# Patient Record
Sex: MALE | Race: WHITE | URBAN - METROPOLITAN AREA
[De-identification: names, ages, dates, MRNs, and addresses within clinical notes are randomized per-mention and may not be internally consistent; named-entity substitution may affect disease eponyms.]

---

## 2018-06-26 ENCOUNTER — EMERGENCY (EMERGENCY)
Facility: HOSPITAL | Age: 73
LOS: 0 days | Discharge: HOME | End: 2018-06-27
Attending: EMERGENCY MEDICINE | Admitting: EMERGENCY MEDICINE

## 2018-06-26 VITALS
OXYGEN SATURATION: 98 % | RESPIRATION RATE: 20 BRPM | DIASTOLIC BLOOD PRESSURE: 98 MMHG | HEART RATE: 88 BPM | SYSTOLIC BLOOD PRESSURE: 195 MMHG | TEMPERATURE: 98 F

## 2018-06-26 DIAGNOSIS — R53.1 WEAKNESS: ICD-10-CM

## 2018-06-26 DIAGNOSIS — Z87.891 PERSONAL HISTORY OF NICOTINE DEPENDENCE: ICD-10-CM

## 2018-06-26 DIAGNOSIS — R07.9 CHEST PAIN, UNSPECIFIED: ICD-10-CM

## 2018-06-26 LAB
ALBUMIN SERPL ELPH-MCNC: 4 G/DL — SIGNIFICANT CHANGE UP (ref 3.5–5.2)
ALP SERPL-CCNC: 61 U/L — SIGNIFICANT CHANGE UP (ref 30–115)
ALT FLD-CCNC: 15 U/L — SIGNIFICANT CHANGE UP (ref 0–41)
ANION GAP SERPL CALC-SCNC: 14 MMOL/L — SIGNIFICANT CHANGE UP (ref 7–14)
APPEARANCE UR: CLEAR — SIGNIFICANT CHANGE UP
AST SERPL-CCNC: 17 U/L — SIGNIFICANT CHANGE UP (ref 0–41)
BASE EXCESS BLDV CALC-SCNC: 1.9 MMOL/L — SIGNIFICANT CHANGE UP (ref -2–2)
BASOPHILS # BLD AUTO: 0.08 K/UL — SIGNIFICANT CHANGE UP (ref 0–0.2)
BASOPHILS NFR BLD AUTO: 1 % — SIGNIFICANT CHANGE UP (ref 0–1)
BILIRUB SERPL-MCNC: 0.6 MG/DL — SIGNIFICANT CHANGE UP (ref 0.2–1.2)
BILIRUB UR-MCNC: NEGATIVE — SIGNIFICANT CHANGE UP
BUN SERPL-MCNC: 21 MG/DL — HIGH (ref 10–20)
CA-I SERPL-SCNC: 1.17 MMOL/L — SIGNIFICANT CHANGE UP (ref 1.12–1.3)
CALCIUM SERPL-MCNC: 9.4 MG/DL — SIGNIFICANT CHANGE UP (ref 8.5–10.1)
CHLORIDE SERPL-SCNC: 104 MMOL/L — SIGNIFICANT CHANGE UP (ref 98–110)
CHOLEST SERPL-MCNC: 236 MG/DL — HIGH (ref 100–200)
CK MB CFR SERPL CALC: 1.5 NG/ML — SIGNIFICANT CHANGE UP (ref 0.6–6.3)
CK MB CFR SERPL CALC: 1.5 NG/ML — SIGNIFICANT CHANGE UP (ref 0.6–6.3)
CK SERPL-CCNC: 101 U/L — SIGNIFICANT CHANGE UP (ref 0–225)
CK SERPL-CCNC: 96 U/L — SIGNIFICANT CHANGE UP (ref 0–225)
CO2 SERPL-SCNC: 23 MMOL/L — SIGNIFICANT CHANGE UP (ref 17–32)
COLOR SPEC: YELLOW — SIGNIFICANT CHANGE UP
CREAT SERPL-MCNC: 1.1 MG/DL — SIGNIFICANT CHANGE UP (ref 0.7–1.5)
DIFF PNL FLD: NEGATIVE — SIGNIFICANT CHANGE UP
EOSINOPHIL # BLD AUTO: 0.2 K/UL — SIGNIFICANT CHANGE UP (ref 0–0.7)
EOSINOPHIL NFR BLD AUTO: 2.5 % — SIGNIFICANT CHANGE UP (ref 0–8)
GAS PNL BLDV: 139 MMOL/L — SIGNIFICANT CHANGE UP (ref 136–145)
GAS PNL BLDV: SIGNIFICANT CHANGE UP
GLUCOSE SERPL-MCNC: 105 MG/DL — HIGH (ref 70–99)
GLUCOSE UR QL: NEGATIVE MG/DL — SIGNIFICANT CHANGE UP
HCO3 BLDV-SCNC: 27 MMOL/L — SIGNIFICANT CHANGE UP (ref 22–29)
HCT VFR BLD CALC: 40.8 % — LOW (ref 42–52)
HCT VFR BLDA CALC: 45 % — HIGH (ref 34–44)
HDLC SERPL-MCNC: 48 MG/DL — SIGNIFICANT CHANGE UP (ref 40–125)
HGB BLD CALC-MCNC: 14.7 G/DL — SIGNIFICANT CHANGE UP (ref 14–18)
HGB BLD-MCNC: 13.7 G/DL — LOW (ref 14–18)
IMM GRANULOCYTES NFR BLD AUTO: 0.5 % — HIGH (ref 0.1–0.3)
KETONES UR-MCNC: NEGATIVE — SIGNIFICANT CHANGE UP
LACTATE BLDV-MCNC: 1.3 MMOL/L — SIGNIFICANT CHANGE UP (ref 0.5–1.6)
LEUKOCYTE ESTERASE UR-ACNC: NEGATIVE — SIGNIFICANT CHANGE UP
LIPID PNL WITH DIRECT LDL SERPL: 170 MG/DL — HIGH (ref 4–129)
LYMPHOCYTES # BLD AUTO: 2.35 K/UL — SIGNIFICANT CHANGE UP (ref 1.2–3.4)
LYMPHOCYTES # BLD AUTO: 29.2 % — SIGNIFICANT CHANGE UP (ref 20.5–51.1)
MCHC RBC-ENTMCNC: 28.1 PG — SIGNIFICANT CHANGE UP (ref 27–31)
MCHC RBC-ENTMCNC: 33.6 G/DL — SIGNIFICANT CHANGE UP (ref 32–37)
MCV RBC AUTO: 83.6 FL — SIGNIFICANT CHANGE UP (ref 80–94)
MONOCYTES # BLD AUTO: 0.67 K/UL — HIGH (ref 0.1–0.6)
MONOCYTES NFR BLD AUTO: 8.3 % — SIGNIFICANT CHANGE UP (ref 1.7–9.3)
NEUTROPHILS # BLD AUTO: 4.7 K/UL — SIGNIFICANT CHANGE UP (ref 1.4–6.5)
NEUTROPHILS NFR BLD AUTO: 58.5 % — SIGNIFICANT CHANGE UP (ref 42.2–75.2)
NITRITE UR-MCNC: NEGATIVE — SIGNIFICANT CHANGE UP
NRBC # BLD: 0 /100 WBCS — SIGNIFICANT CHANGE UP (ref 0–0)
NT-PROBNP SERPL-SCNC: 100 PG/ML — SIGNIFICANT CHANGE UP (ref 0–300)
PCO2 BLDV: 42 MMHG — SIGNIFICANT CHANGE UP (ref 41–51)
PH BLDV: 7.41 — SIGNIFICANT CHANGE UP (ref 7.26–7.43)
PH UR: 7.5 — SIGNIFICANT CHANGE UP (ref 5–8)
PLATELET # BLD AUTO: 338 K/UL — SIGNIFICANT CHANGE UP (ref 130–400)
PO2 BLDV: 39 MMHG — SIGNIFICANT CHANGE UP (ref 20–40)
POTASSIUM BLDV-SCNC: 4.1 MMOL/L — SIGNIFICANT CHANGE UP (ref 3.3–5.6)
POTASSIUM SERPL-MCNC: 4.5 MMOL/L — SIGNIFICANT CHANGE UP (ref 3.5–5)
POTASSIUM SERPL-SCNC: 4.5 MMOL/L — SIGNIFICANT CHANGE UP (ref 3.5–5)
PROT SERPL-MCNC: 6.7 G/DL — SIGNIFICANT CHANGE UP (ref 6–8)
PROT UR-MCNC: NEGATIVE MG/DL — SIGNIFICANT CHANGE UP
RBC # BLD: 4.88 M/UL — SIGNIFICANT CHANGE UP (ref 4.7–6.1)
RBC # FLD: 13.5 % — SIGNIFICANT CHANGE UP (ref 11.5–14.5)
SAO2 % BLDV: 71 % — SIGNIFICANT CHANGE UP
SODIUM SERPL-SCNC: 141 MMOL/L — SIGNIFICANT CHANGE UP (ref 135–146)
SP GR SPEC: 1.01 — SIGNIFICANT CHANGE UP (ref 1.01–1.03)
TOTAL CHOLESTEROL/HDL RATIO MEASUREMENT: 4.9 RATIO — SIGNIFICANT CHANGE UP (ref 4–5.5)
TRIGL SERPL-MCNC: 141 MG/DL — SIGNIFICANT CHANGE UP (ref 10–149)
TROPONIN T SERPL-MCNC: <0.01 NG/ML — SIGNIFICANT CHANGE UP
TROPONIN T SERPL-MCNC: <0.01 NG/ML — SIGNIFICANT CHANGE UP
UROBILINOGEN FLD QL: 0.2 MG/DL — SIGNIFICANT CHANGE UP (ref 0.2–0.2)
WBC # BLD: 8.04 K/UL — SIGNIFICANT CHANGE UP (ref 4.8–10.8)
WBC # FLD AUTO: 8.04 K/UL — SIGNIFICANT CHANGE UP (ref 4.8–10.8)

## 2018-06-26 RX ORDER — SODIUM CHLORIDE 9 MG/ML
1000 INJECTION INTRAMUSCULAR; INTRAVENOUS; SUBCUTANEOUS ONCE
Qty: 0 | Refills: 0 | Status: COMPLETED | OUTPATIENT
Start: 2018-06-26 | End: 2018-06-26

## 2018-06-26 RX ORDER — FAMOTIDINE 10 MG/ML
40 INJECTION INTRAVENOUS ONCE
Qty: 0 | Refills: 0 | Status: COMPLETED | OUTPATIENT
Start: 2018-06-26 | End: 2018-06-26

## 2018-06-26 RX ORDER — ADENOSINE 3 MG/ML
60 INJECTION INTRAVENOUS ONCE
Qty: 0 | Refills: 0 | Status: DISCONTINUED | OUTPATIENT
Start: 2018-06-26 | End: 2018-06-27

## 2018-06-26 RX ADMIN — FAMOTIDINE 40 MILLIGRAM(S): 10 INJECTION INTRAVENOUS at 21:22

## 2018-06-26 RX ADMIN — SODIUM CHLORIDE 1000 MILLILITER(S): 9 INJECTION INTRAMUSCULAR; INTRAVENOUS; SUBCUTANEOUS at 12:35

## 2018-06-26 NOTE — ED PROVIDER NOTE - NS ED ROS FT
Review of Systems:  	•	CONSTITUTIONAL - no fever, no diaphoresis, no chills  	•	SKIN - no rash  	•	HEMATOLOGIC - no bleeding, no bruising  	•	EYES - no eye pain, (+) blurry vision today   	•	ENT - no change in hearing, no sore throat, no tinnitus  	•	RESPIRATORY - (+) SOB, no cough  	•	CARDIAC - (+) intermittent non radiating CP  	•	GI - no abd pain, no nausea, no vomiting, no diarrhea, no constipation  	•	MUSCULOSKELETAL - no joint paint, no swelling, no redness  	•	NEUROLOGIC - (+) generalized weakness and weakness in b/l arms,  (+) lip numbness/tingling, (+) headache, no LOC   	•	PSYCH - no anxiety, non suicidal, non homicidal, no hallucination, no depression Review of Systems:  	•	CONSTITUTIONAL - no fever, no diaphoresis, no chills  	•	SKIN - no rash  	•	HEMATOLOGIC - no bleeding, no bruising  	•	EYES - no eye pain  	•	ENT - no change in hearing, no sore throat, no tinnitus  	•	RESPIRATORY - (+) SOB, no cough  	•	CARDIAC - (+) intermittent non radiating CP  	•	GI - no abd pain, no nausea, no vomiting, no diarrhea, no constipation  	•	MUSCULOSKELETAL - no joint paint, no swelling, no redness  	•	NEUROLOGIC - (+) generalized weakness and weakness in b/l arms,  (+) lip numbness/tingling, (+) headache, no LOC   	•	PSYCH - no anxiety, non suicidal, non homicidal, no hallucination, no depression

## 2018-06-26 NOTE — ED ADULT TRIAGE NOTE - CHIEF COMPLAINT QUOTE
patient c/o dizziness, SOB, and weakness. States when he eats he feels a lump in his chest. Denies CP.

## 2018-06-26 NOTE — ED ADULT TRIAGE NOTE - MODE OF ARRIVAL
April 1, 2017    Shruthi LEGER [address]  [place] [address]             [facility]  [address]  [facility] [address]  Phone: [phone] Dear Mrs. Lockett:    [facility] is committed to your overall health.  Our records indicate that you are due for an annual checkup with your primary care provider,  Dr. Elizabeth.  Please call [phone] to schedule a routine physical exam. You may also be due for the following test and/or procedures:    Tetanus immunization  Shingles immunization  Pneumonia immunization    If you have a home blood pressure monitor, please bring it with you to your appointment so that we may test for accuracy.     If you have had any of the above done at another facility, please let us know by calling [phone] so that we can update your record.  We will add these results to your chart if you fax them to the fax number listed below.  If you have any questions, please call [phone].       If you have any questions or concerns, please don't hesitate to call.    Sincerely,    Lizzy Mitchell  Clinical Care Coordinator  [facility]  [address] [place] [address]  Phone: [phone]   Fax: [phone]           Walk in

## 2018-06-26 NOTE — ED PROVIDER NOTE - OBJECTIVE STATEMENT
73 y/o M no reported PMHx presents to ED with generalized weakness, dizziness, headache and SOB worse with exertion x2 weeks getting progressively worse, also with intermittent non radiating CP and lip tingling and numbness. Pt had existing appointment with cardiologist Dr. Mckeon in a few weeks but began feeling worsening lightheadedness and blurry vision today so presents to ED for evaluation. Pt reports generalized weakness in b/l arms for months. Pt reports SX are worse with lying down. Pt has been more tired than usual, sleeping excessively throughout the day according to wife. No n/v.  No leg swelling.  No blood in stool. No weight loss. No known trauma. PMD Dr. Greene. 73 y/o M presents to ED with generalized weakness, lightheadedness, intermittent HA, SOB worse with exertion x2 weeks getting progressively worse, also with intermittent non radiating CP (not now) and lip tingling and numbness. Pt reports generalized weakness in b/l arms for months. Pt has been more tired than usual, sleeping excessively throughout the day according to wife. No n/v.  No leg swelling.  No blood in stool. No weight loss. No known trauma. PMD Dr. Greene.

## 2018-06-26 NOTE — ED CDU PROVIDER INITIAL DAY NOTE - ATTENDING CONTRIBUTION TO CARE
Seen with PA agree with above, lungs- clear, abdomen- soft no tenderness to any region, s1s2 no gallops or murmurs, neuro- non focal, Full workup in progress will continue to re-evaluate

## 2018-06-26 NOTE — ED CDU PROVIDER INITIAL DAY NOTE - OBJECTIVE STATEMENT
71 y/o male with no significant PMHX, Former smoker, presenting under two midnight protocol. Pt presenting to with multiple complaints. As per pt for the past approx month has been SOB on exertion - upstairs or inclines, can ambulate a block. denies orthopnea, LE edema. Pt also states he has  had an increase in tiredness and generalized weakness, wife states there has been an increase in patient sleeping throughout the day.  Pt came to the ED today because he had a headache and dizziness associated with blurred vision.  Pt states he had mild numbness of bottom left lip. Also states that he occasionally after eating he will have pain in the middle of his chest that last a few minutes then subsides, currently take pantoprazole. Cardiologist - Dr Mckeon. Denies any slurred speech, facial drooping, palpitations, syncope, fever/chills, N/V/D.

## 2018-06-26 NOTE — ED ADULT NURSE NOTE - OBJECTIVE STATEMENT
pt comes in with complaints of lightheadness/dizzyness. sob worse with ambulating. when he eats, he feels a lump in his chest. has left sided sharp chest pain that comes and goes. has lip tingling that comes and goes. denies black stools. no fever, no n/v. sleeping more then normal. denies falling. weakness it not greater on one side then the other

## 2018-06-26 NOTE — ED PROVIDER NOTE - PHYSICAL EXAMINATION
Alert, NAD, WDWN, non-toxic appearing  PERRL, EOMI, normal pupils, no icterus, normal external ENT, pink/moist membranes, pharynx normal  Airway intact, normal resp effort w/o tachypnea, speaking full sentences, lungs CTA b/l  CVS1S2, RRR, no m/g/r, no JVD, 2+ pulses b/l, no edema, warm/well-perfused  Abdomen soft, no tender/dist/guard/rebound, no CVA tender  FROM all 4 ext, no bony tender/deform, skin warm/dry, no rash  AAOx3, CN 2-12 intact, normal motor, normal sensory, normal gait

## 2018-06-26 NOTE — ED CDU PROVIDER INITIAL DAY NOTE - PROGRESS NOTE DETAILS
s/o note:  received s/o from MARIA GUADALUPE Baron evaluated patient at bedside.  Denies any complaints. patient reports feeling much better. PE: NAD. VSS. s1 s2 no murmur, abd soft/ND/NT/ no swelling and tenderness to extremities. pulses are equal to all extremities. Neuro exam grossly unremarkable. speaking coherently and clear speech. pending stress in am

## 2018-06-27 VITALS
SYSTOLIC BLOOD PRESSURE: 166 MMHG | TEMPERATURE: 97 F | HEART RATE: 75 BPM | DIASTOLIC BLOOD PRESSURE: 93 MMHG | RESPIRATION RATE: 18 BRPM

## 2018-06-27 NOTE — ED CDU PROVIDER SUBSEQUENT DAY NOTE - PROGRESS NOTE DETAILS
Pt stable. Denies any complaints. Currently awaiting nuclear stress test. Discussed results with pt. Pt understands to follow up with pmd/cardiologist.

## 2018-06-27 NOTE — ED CDU PROVIDER SUBSEQUENT DAY NOTE - HISTORY
Sign out received by MARIA GUADALUPE Grider. Pt stable. 2 Ekgs done and wnl. Awaiting echo and nuclear stress test.

## 2018-06-27 NOTE — ED ADULT NURSE REASSESSMENT NOTE - NS ED NURSE REASSESS COMMENT FT1
Pt aox3, in no acute distress, on cardiac monitor and pulse ox, will continue to monitor the pt.
pt. resting at this time, no distress noted, on cardiac monitor, will continue to monitor.
no distress noted, family at bedside, pt. offered assistance, placed on cardiac monitor, will continue to monitor

## 2018-06-27 NOTE — ED CDU PROVIDER DISPOSITION NOTE - CLINICAL COURSE
Patient was sent to [facility] for further evaluation of epigastric pains feeling tired and SOB on excertion, Has remained in no distress and with stable vitals since arrival to [facility], ekgs times two no evidence of ischemic changes, enzymes times two normal, Nuclear stress testing was read as normal, 2- d echo essentially normal, Copies of all blood work and other studies were given to family and patient and will fallow up with bot Cardiology Trice Vernon and will fallow up with Pulmonary evaluation and for possible sleep apnea

## 2019-11-04 ENCOUNTER — EMERGENCY (EMERGENCY)
Facility: HOSPITAL | Age: 74
LOS: 0 days | Discharge: HOME | End: 2019-11-05
Attending: EMERGENCY MEDICINE | Admitting: EMERGENCY MEDICINE
Payer: MEDICARE

## 2019-11-04 VITALS
TEMPERATURE: 98 F | WEIGHT: 169.98 LBS | HEIGHT: 68 IN | SYSTOLIC BLOOD PRESSURE: 142 MMHG | DIASTOLIC BLOOD PRESSURE: 87 MMHG | RESPIRATION RATE: 18 BRPM | OXYGEN SATURATION: 99 % | HEART RATE: 83 BPM

## 2019-11-04 DIAGNOSIS — T20.10XA BURN OF FIRST DEGREE OF HEAD, FACE, AND NECK, UNSPECIFIED SITE, INITIAL ENCOUNTER: ICD-10-CM

## 2019-11-04 DIAGNOSIS — Z23 ENCOUNTER FOR IMMUNIZATION: ICD-10-CM
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
## 2019-11-04 PROCEDURE — 99283 EMERGENCY DEPT VISIT LOW MDM: CPT | Mod: 25

## 2019-11-04 PROCEDURE — 16000 INITIAL TREATMENT OF BURN(S): CPT

## 2019-11-04 RX ORDER — TETANUS TOXOID, REDUCED DIPHTHERIA TOXOID AND ACELLULAR PERTUSSIS VACCINE, ADSORBED 5; 2.5; 8; 8; 2.5 [IU]/.5ML; [IU]/.5ML; UG/.5ML; UG/.5ML; UG/.5ML
0.5 SUSPENSION INTRAMUSCULAR ONCE
Refills: 0 | Status: COMPLETED | OUTPATIENT
Start: 2019-11-04 | End: 2019-11-04

## 2019-11-04 RX ORDER — ACETAMINOPHEN 500 MG
975 TABLET ORAL ONCE
Refills: 0 | Status: COMPLETED | OUTPATIENT
Start: 2019-11-04 | End: 2019-11-04

## 2019-11-04 RX ADMIN — Medication 975 MILLIGRAM(S): at 20:31

## 2019-11-04 RX ADMIN — TETANUS TOXOID, REDUCED DIPHTHERIA TOXOID AND ACELLULAR PERTUSSIS VACCINE, ADSORBED 0.5 MILLILITER(S): 5; 2.5; 8; 8; 2.5 SUSPENSION INTRAMUSCULAR at 20:31
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
## 2019-11-04 NOTE — ED PROVIDER NOTE - NS ED MD EM SELECTION
Discussion/Summary   Your HDL cholesterol is a little low so please increase your exercise to help increase this healthy cholesterol level.  Your kidney function has improved.        Verified Results  LIPID PNL [id number] 12:01AM NARCISA WILKS     Test Name Result Flag Reference   FASTING STATUS UNKNOWN hrs     CHOLESTEROL 133 mg/dl  <200   Desirable            <200  Borderline High      200 to 239  High                 >=240   HDL CHOLESTEROL 45 mg/dl L >49   Low            <40  Borderline Low 40 to 49  Near Optimal   50 to 59  Optimal        >=60   TRIGLYCERIDES 69 mg/dl  <150   Normal                   <150  Borderline High          150 to 199  High                     200 to 499  Very High                >=500   LDL CHOLESTEROL (CALCULATED) 74 mg/dl  <130   OPTIMAL               <100  NEAR OPTIMAL          100-129  BORDERLINE HIGH       130-159  HIGH                  160-189  VERY HIGH             >=190   NON-HDL CHOLESTEROL 88 mg/dl     Therapeutic Target:  CHD and risk equivalents <130  Multiple risk factors    <160  0 to 1 risk factors      <190   CHOLESTEROL/HDL RATIO 3.0  <4.5     BASIC METABOLIC PNL [id number] 12:01AM NARCISA WILKS     Test Name Result Flag Reference   SODIUM 144 mmol/L  135-145   POTASSIUM 4.3 mmol/L  3.4-5.1   CHLORIDE 114 mmol/L H    CARBON DIOXIDE 22 mmol/L  21-32   ANION GAP 12 mmol/L  10-20   GLUCOSE 84 mg/dl  65-99   BUN 20 mg/dl  6-20   CREATININE 1.41 mg/dl H 0.51-0.95   GFR EST.AFRICAN AMER 46     eGFR 30-59 mL/min/1.73m2 = Moderate decrease in kidney function. Stage 3 CKD (chronic kidney disease) or moderate kidney disease.   GFR EST.NONAFRI AMER 40     eGFR 30-59 mL/min/1.73m2 = Moderate decrease in kidney function. Stage 3 CKD (chronic kidney disease) or moderate kidney disease.   BUN/CREATININE RATIO 14  7-25   CALCIUM 9.7 mg/dl  8.4-10.2   FASTING STATUS UNKNOWN hrs          [address] Exp Problem Focused - Mod. Complex

## 2019-11-04 NOTE — ED ADULT NURSE NOTE - NSIMPLEMENTINTERV_GEN_ALL_ED
Implemented All Universal Safety Interventions:  [place] to call system. Call bell, personal items and telephone within reach. Instruct patient to call for assistance. Room bathroom lighting operational. Non-slip footwear when patient is off stretcher. Physically safe environment: no spills, clutter or unnecessary equipment. Stretcher in lowest position, wheels locked, appropriate side rails in place.

## 2019-11-04 NOTE — ED PROVIDER NOTE - PATIENT PORTAL LINK FT
You can access the FollowMyHealth Patient Portal offered by [facility] by registering at the following website: [email or web address]. By joining [employer]’s FollowMyHealth portal, you will also be able to view your health information using other applications (apps) compatible with our system.
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
## 2019-11-04 NOTE — ED PROVIDER NOTE - NSFOLLOWUPCLINICS_GEN_ALL_ED_FT
[facility]  [facility]  [address], [address]  [place] [address]  Phone: [phone]  Fax:   Follow Up Time:
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
## 2019-11-04 NOTE — ED PROVIDER NOTE - PHYSICAL EXAMINATION
Physical Exam    Vital Signs: I have reviewed the initial vital signs.  Constitutional: well-nourished, appears stated age, no acute distress  Eyes: Conjunctiva pink, Sclera clear, PERRLA, EOMI.  Nose: no singed nares  Throat: no swelling   Cardiovascular: S1 and S2, regular rate, regular rhythm, well-perfused extremities, radial pulses equal and 2+  Respiratory: unlabored respiratory effort, clear to auscultation bilaterally no wheezing, rales and rhonchi  Gastrointestinal: soft, non-tender abdomen, no pulsatile mass, normal bowl sounds  Musculoskeletal: supple neck, no lower extremity edema, no midline tenderness  Integumentary: warm, dry, +erythema to left side of face with no blisters present + singed eyebrow and left scalp hair  Neurologic: awake, alert, cranial nerves II-XII grossly intact, extremities’ motor and sensory functions grossly intact  Psychiatric: appropriate mood, appropriate affect

## 2019-11-05 PROBLEM — K21.9 GASTRO-ESOPHAGEAL REFLUX DISEASE WITHOUT ESOPHAGITIS: Chronic | Status: ACTIVE | Noted: 2018-06-26

## 2020-01-14 ENCOUNTER — TRANSCRIPTION ENCOUNTER (OUTPATIENT)
Age: 75
End: 2020-01-14

## 2021-08-24 ENCOUNTER — FOLLOW UP (OUTPATIENT)
Dept: URBAN - METROPOLITAN AREA CLINIC 6 | Facility: CLINIC | Age: 76
End: 2021-08-24

## 2021-08-24 DIAGNOSIS — H40.1132: ICD-10-CM

## 2021-08-24 DIAGNOSIS — Z96.1: ICD-10-CM

## 2021-08-24 PROCEDURE — 92014 COMPRE OPH EXAM EST PT 1/>: CPT

## 2021-08-24 ASSESSMENT — VISUAL ACUITY
OD_SC: 20/40
OD_PH: 20/20
OS_PH: 20/40
OS_SC: 20/40

## 2021-08-24 ASSESSMENT — TONOMETRY
OD_IOP_MMHG: 14
OS_IOP_MMHG: 14

## 2022-08-31 ENCOUNTER — OFFICE VISIT (OUTPATIENT)
Dept: NEUROSURGERY | Facility: CLINIC | Age: 77
End: 2022-08-31
Payer: MEDICARE

## 2022-08-31 ENCOUNTER — TELEPHONE (OUTPATIENT)
Dept: NEUROLOGY | Facility: CLINIC | Age: 77
End: 2022-08-31

## 2022-08-31 VITALS
HEART RATE: 74 BPM | BODY MASS INDEX: 25.76 KG/M2 | HEIGHT: 68 IN | SYSTOLIC BLOOD PRESSURE: 138 MMHG | DIASTOLIC BLOOD PRESSURE: 82 MMHG | TEMPERATURE: 97.6 F | WEIGHT: 170 LBS | OXYGEN SATURATION: 97 %

## 2022-08-31 DIAGNOSIS — R93.0 ABNORMAL MRA, HEAD: ICD-10-CM

## 2022-08-31 PROBLEM — I65.1 BASILAR ARTERY STENOSIS: Status: ACTIVE | Noted: 2022-08-31

## 2022-08-31 PROCEDURE — 99204 OFFICE O/P NEW MOD 45 MIN: CPT | Performed by: NURSE PRACTITIONER

## 2022-08-31 RX ORDER — CYCLOSPORINE 0.5 MG/ML
EMULSION OPHTHALMIC
COMMUNITY
Start: 2022-05-26

## 2022-08-31 RX ORDER — CLOPIDOGREL BISULFATE 75 MG/1
75 TABLET ORAL DAILY
COMMUNITY
Start: 2022-08-08

## 2022-08-31 RX ORDER — LATANOPROST 50 UG/ML
SOLUTION/ DROPS OPHTHALMIC
COMMUNITY
Start: 2022-08-25

## 2022-08-31 RX ORDER — ATORVASTATIN CALCIUM 80 MG/1
TABLET, FILM COATED ORAL
COMMUNITY
Start: 2022-08-08

## 2022-08-31 NOTE — ASSESSMENT & PLAN NOTE
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 and MRA head/neck was recommended which patient underwent  Imaging reviewed with Dr Ryann Leo:    MRA head and neck:  Done at outside facility, read states short-segment severe stenosis versus occlusion of distal basilar artery  Very poor imaging study    Plan:  · Continue to monitor neurological exam  · Reviewed imaging with patient  · Ordered CTA head and neck w/wo to further assess vasculature  · Also ordered P2Y12 since patient is on Plavix  · Patient will follow-up in the next 2 weeks once imaging and blood work completed or sooner symptoms worsen  · Also placed referral to Neurology for stroke prevention  · Given patient's ongoing headaches which are new  Spoke with patient about keeping a journal and checking his blood pressure when he develops headaches for the next few weeks  · Patient made aware to seek care sooner if he develops any new or worsening neurological changes or red flag signs   · Patient made aware to contact Neurosurgery with any further questions or concerns

## 2022-08-31 NOTE — TELEPHONE ENCOUNTER
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

## 2022-08-31 NOTE — PROGRESS NOTES
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 and MRA head/neck was recommended which patient underwent  Imaging reviewed with Dr Shaheed Ross:    MRA head and neck:  Done at outside facility, read states short-segment severe stenosis versus occlusion of distal basilar artery  Very poor imaging study    Plan:  · Continue to monitor neurological exam  · Reviewed imaging with patient  · Ordered CTA head and neck w/wo to further assess vasculature  · Also ordered P2Y12 since patient is on Plavix  · Patient will follow-up in the next 2 weeks once imaging and blood work completed or sooner symptoms worsen  · Also placed referral to Neurology for stroke prevention  · Given patient's ongoing headaches which are new  Spoke with patient about keeping a journal and checking his blood pressure when he develops headaches for the next few weeks  · Patient made aware to seek care sooner if he develops any new or worsening neurological changes or red flag signs   · Patient made aware to contact Neurosurgery with any further questions or concerns  Diagnoses and all orders for this visit:    Abnormal MRA, head  -     Ambulatory Referral to Neurosurgery  -     CTA head and neck w wo contrast; Future  -     Creatinine, serum; Future  -     BUN; Future  -     P2Y12 Platelet inhibitor; Future  -     Ambulatory referral to Neurology;  Future    Other orders  -     atorvastatin (LIPITOR) 80 mg tablet; TAKE 1 TABLET BY MOUTH EVERY 24 HOURS IN THE EVENING  -     latanoprost (XALATAN) 0 005 % ophthalmic solution  -     Restasis 0 05 % ophthalmic emulsion  -     clopidogrel (PLAVIX) 75 mg tablet; Take 75 mg by mouth daily              CHIEF COMPLAINT    Chief Complaint   Patient presents with    Consult     Abnormal MRA, head       HISTORY    History of Present Illness     76y o  year old male with past medical history significant for cataracts status post surgery  Patient seen in outpatient office today as a self-referral for further workup and evaluation of possible basilar artery stenosis and possible left vertebral artery stenosis  Patient was originally seen when he presented to urgent care in June after finding a lump in the front of his left ear and was given Keflex, lump has since resolved  He ultimately underwent a CT scan which demonstrated a absent enhancement in the 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 and MRA imaging was recommended which demonstrated possible distal basilar artery stenosis versus occlusion  MRA very poor quality  These were incidental findings  Patient states he believes he was started on aspirin in July after CT scan was completed  He states he likely started plavix at beginning of August after MRI completed both prescribed by his PCP  He states he normally does not have headaches but he states over the past few months he noticed headaches almost every day but were mostly manageable  He also reports some left eye pain as well as blurry vision more on left than right  He does report receiving cataract surgery about a year ago  His family also noted some memory problems as well as confusion starting in January but this is not constant  Also endorses some lightheadedness which is slight and usually associated with a headache  Patient currently complaining of a slight headache above left eye which he rates 2-3/10    He also reports some shortness of breath if he exerts himself  He denies any stroke-like symptoms  He denies any recent falls or traumas or difficulty with his balance  He has not recently seen Neurology  He denies any chest pain, abdominal pain, nausea, vomiting, diarrhea, no problems with bowel or bladder, no new weakness or numbness/tingling  Both myself and Dr Marlee García discussed how patient does not demonstrate any symptoms of basilar artery insufficiency  Would recommend continuing medical management with aspirin and Plavix  Given MRA poor quality will repeat imaging in the next few weeks with CTA head and neck to further assess vasculature  Also will have patient have P2Y12 drawn since on Plavix  Also placed referral to Neurology for stroke prevention  Patient will follow-up in the next few weeks once imaging completed or sooner symptoms worsen  HPI    See Discussion    REVIEW OF SYSTEMS    Review of Systems   Constitutional: Positive for chills  HENT: Negative  Eyes: Positive for pain (left eye off and on) and visual disturbance (left eye gets blurry at times)  Respiratory: Positive for shortness of breath (mild)  Cardiovascular: Negative  Gastrointestinal: Negative  Endocrine: Negative  Genitourinary: Negative  Musculoskeletal: Positive for neck stiffness (cracking lately)  Skin: Negative  Allergic/Immunologic: Negative  Neurological: Positive for light-headedness (at times) and headaches (almost daily, manageable)  Hematological: Negative  Psychiatric/Behavioral: Positive for confusion  Memory   All other systems reviewed and are negative  ROS reviewed with patient and agree and changes were made as needed      Meds/Allergies     Current Outpatient Medications   Medication Sig Dispense Refill    atorvastatin (LIPITOR) 80 mg tablet TAKE 1 TABLET BY MOUTH EVERY 24 HOURS IN THE EVENING      clopidogrel (PLAVIX) 75 mg tablet Take 75 mg by mouth daily      latanoprost (XALATAN) 0 005 % ophthalmic solution       Restasis 0 05 % ophthalmic emulsion        No current facility-administered medications for this visit  No Known Allergies    PAST HISTORY    History reviewed  No pertinent past medical history  History reviewed  No pertinent surgical history  History reviewed  No pertinent family history  Above history personally reviewed  EXAM    Vitals:Blood pressure 138/82, pulse 74, temperature 97 6 °F (36 4 °C), temperature source Temporal, height 5' 8" (1 727 m), weight 77 1 kg (170 lb), SpO2 97 %  ,Body mass index is 25 85 kg/m²  Physical Exam  Vitals reviewed  Constitutional:       General: He is awake  He is not in acute distress  Appearance: Normal appearance  He is not ill-appearing  HENT:      Head: Normocephalic and atraumatic  Eyes:      Extraocular Movements: Extraocular movements intact and EOM normal       Conjunctiva/sclera: Conjunctivae normal       Pupils: Pupils are equal, round, and reactive to light  Cardiovascular:      Rate and Rhythm: Normal rate  Pulmonary:      Effort: Pulmonary effort is normal  No respiratory distress  Chest:      Chest wall: No tenderness  Abdominal:      General: There is no distension  Palpations: Abdomen is soft  Tenderness: There is no abdominal tenderness  Musculoskeletal:         General: Normal range of motion  Cervical back: Normal range of motion and neck supple  Skin:     General: Skin is warm and dry  Neurological:      Mental Status: He is alert and oriented to person, place, and time  Coordination: Finger-Nose-Finger Test normal       Gait: Gait is intact  Deep Tendon Reflexes: Strength normal       Reflex Scores:       Bicep reflexes are 2+ on the right side and 2+ on the left side  Patellar reflexes are 2+ on the right side and 2+ on the left side    Psychiatric:         Attention and Perception: Attention and perception normal          Mood and Affect: Mood and affect normal          Speech: Speech normal          Behavior: Behavior normal  Behavior is cooperative  Thought Content: Thought content normal          Cognition and Memory: Cognition normal          Judgment: Judgment normal          Neurologic Exam     Mental Status   Oriented to person, place, and time  Follows 2 step commands  Attention: normal  Concentration: normal    Speech: speech is normal   Level of consciousness: alert  Knowledge: good  Able to perform simple calculations  Able to name object  Normal comprehension  Cranial Nerves     CN II   Right visual field deficit: none  Left visual field deficit: Grossly intact except when 3 fingers held in front of patient with right eye closed he originally states he saw 2 fingers but then upon repetitions states 3  CN III, IV, VI   Pupils are equal, round, and reactive to light  Extraocular motions are normal    CN III: no CN III palsy  CN VI: no CN VI palsy  Nystagmus: none   Diplopia: none  Conjugate gaze: present    CN V   Facial sensation intact  CN VII   Facial expression full, symmetric  CN VIII   CN VIII normal    Hearing: intact    CN IX, X   CN IX normal      CN XI   CN XI normal      CN XII   CN XII normal      Motor Exam   Muscle bulk: normal  Overall muscle tone: normal  Right arm pronator drift: absent  Left arm pronator drift: absent    Strength   Strength 5/5 throughout  Sensory Exam   Light touch normal    Proprioception normal    JPS and DST intact     Gait, Coordination, and Reflexes     Gait  Gait: normal    Coordination   Finger to nose coordination: normal    Tremor   Resting tremor: absent  Intention tremor: absent  Action tremor: absent    Reflexes   Right biceps: 2+  Left biceps: 2+  Right patellar: 2+  Left patellar: 2+  Right [place]: absent  Left [place]: absent  Right ankle clonus: absent  Left ankle clonus: absent        MEDICAL DECISION MAKING    Imaging Studies:     No results found      I have personally reviewed pertinent reports     and I have personally reviewed pertinent films in PACS

## 2022-08-31 NOTE — PATIENT INSTRUCTIONS
Patient will follow-up in the next few weeks with CTA head and neck or sooner symptoms worsen    Ordered P2Y12 since patient is on aspirin and Plavix

## 2022-09-10 ENCOUNTER — APPOINTMENT (OUTPATIENT)
Dept: LAB | Facility: CLINIC | Age: 77
End: 2022-09-10
Payer: MEDICARE

## 2022-09-10 DIAGNOSIS — R93.0 ABNORMAL MRA, HEAD: ICD-10-CM

## 2022-09-10 LAB
BUN SERPL-MCNC: 24 MG/DL (ref 5–25)
CREAT SERPL-MCNC: 1.16 MG/DL (ref 0.6–1.3)
GFR SERPL CREATININE-BSD FRML MDRD: 59 ML/MIN/1.73SQ M
PA ADP BLD-ACNC: 79 PRU (ref 194–418)

## 2022-09-10 PROCEDURE — 36415 COLL VENOUS BLD VENIPUNCTURE: CPT

## 2022-09-10 PROCEDURE — 84520 ASSAY OF UREA NITROGEN: CPT

## 2022-09-10 PROCEDURE — 82565 ASSAY OF CREATININE: CPT

## 2022-09-10 PROCEDURE — 85576 BLOOD PLATELET AGGREGATION: CPT

## 2022-09-14 ENCOUNTER — HOSPITAL ENCOUNTER (OUTPATIENT)
Dept: RADIOLOGY | Facility: HOSPITAL | Age: 77
Discharge: HOME | End: 2022-09-14
Payer: MEDICARE

## 2022-09-14 DIAGNOSIS — R93.0 ABNORMAL MRA, HEAD: ICD-10-CM

## 2022-09-14 PROCEDURE — G1004 CDSM NDSC: HCPCS

## 2022-09-14 PROCEDURE — 70496 CT ANGIOGRAPHY HEAD: CPT

## 2022-09-14 PROCEDURE — 70498 CT ANGIOGRAPHY NECK: CPT

## 2022-09-14 RX ADMIN — IOHEXOL 85 ML: 350 INJECTION, SOLUTION INTRAVENOUS at 17:50

## 2022-09-27 PROBLEM — Z00.00 ENCOUNTER FOR PREVENTIVE HEALTH EXAMINATION: Status: ACTIVE | Noted: 2022-09-27

## 2022-09-29 ENCOUNTER — OFFICE VISIT (OUTPATIENT)
Dept: NEUROSURGERY | Facility: CLINIC | Age: 77
End: 2022-09-29
Payer: MEDICARE

## 2022-09-29 VITALS
SYSTOLIC BLOOD PRESSURE: 138 MMHG | WEIGHT: 176 LBS | HEART RATE: 77 BPM | BODY MASS INDEX: 26.67 KG/M2 | TEMPERATURE: 97.5 F | RESPIRATION RATE: 16 BRPM | DIASTOLIC BLOOD PRESSURE: 80 MMHG | HEIGHT: 68 IN

## 2022-09-29 DIAGNOSIS — I65.1 BASILAR ARTERY STENOSIS: Primary | ICD-10-CM

## 2022-09-29 DIAGNOSIS — R93.0 ABNORMAL MRA, HEAD: ICD-10-CM

## 2022-09-29 PROCEDURE — 99214 OFFICE O/P EST MOD 30 MIN: CPT | Performed by: NURSE PRACTITIONER

## 2022-09-29 RX ORDER — ASPIRIN 81 MG/1
81 TABLET ORAL DAILY
COMMUNITY
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
## 2022-09-29 NOTE — PATIENT INSTRUCTIONS
Symptoms of vertebrobasilar insufficiency   Loss of vision in part or all of both eyes  Double vision  Vertigo (spinning sensation)   Numbness or tingling  Nausea and vomiting  Slurred speech  Loss of coordination, dizziness or confusion  Trouble swallowing  Symptoms include a stroke or stroke-like attack with associated numbness, weakness, or tingling     People may experience:   Whole body: collapse or dizziness   Muscular: problems with coordination or weakness   Also common: double vision, mini-strokes (transient ischemic attacks), nausea, reduced sensation of touch, or stroke

## 2022-09-29 NOTE — ASSESSMENT & PLAN NOTE
As addressed in HPI   Is maintaining Headache diary     Imagining   · 9/14/2022 CTA Brain and neck w/wo contrast  Very diminutive left vertebral artery which focally occludes at the proximal V3 segment at the C2-C3 intervertebral disc level and subsequent faint wisp-like reconstitution of the distal V3/V4 segments, possibly related  Deepa Skipper Deepa Skipper Correlation for signs and symptoms vertebrobasilar insufficiency recommended  2   Focal tandem occlusion of the distal basilar artery just above the right superior cerebellar artery origin  3   Left posterior cerebral artery and left superior cerebellar arteries appear to be reconstituted via collateral flow from the left posterior communicating artery  Deepa Skipper Deepa Skipper No acute infarction, intracranial hemorrhage or mass effect  Reviewed in collaboration with Dr Lynn Ley and case discussion regarding ngoing neurosurgery involvement  Plan   · CTA Brain and neck reviewed with patient/daughter   · Continue current medication dosing ASA 81 mg po qd and Plavix 75mg po qd   · Scheduled neurology consult visit appointment 1/10/2023, checkout called at end of visit to inquire if earlier appointment and confirm patient on a cancellation list   As per case discussion with Dr Lynn Ley and explained to patient  · No surgical intervention vertebrobasilar artery with complete occlusion, is not amenable to surgical intervention   · Has collateral circulation   · Continue medical management under care of neurology and PCP   · Continue ani platelets as above  · At risk for stroke, need o control risks HTN, Hypercholesterolemia, --PCP will monitor and treat as indicated  Stopped smoking 40 years ago    · Continue headache diary when headache occur check and document B/P , and any other symptoms such as dizziness, visual changes etc as per symptoms provided in AVS  Take diary to neurology and PCP visits      · AVS education provided and reviewed symptoms of vertebrobasilar insufficiency, stroke , low salt diet, low cholesterol diet  · If stroke like symptoms for vertebral artery insufficiency report to closest emergency department   · Continue  Routine eye examinations , you reported eye examination every 6 months  · F/U with neurosurgery prn  · Weill electronically submit office visit note to PCP (as per patient's request) and Dr Lexie Gitelman, neurology

## 2022-10-06 ENCOUNTER — TELEPHONE (OUTPATIENT)
Dept: NEUROLOGY | Facility: CLINIC | Age: 77
End: 2022-10-06

## 2022-10-06 NOTE — TELEPHONE ENCOUNTER
Called patient to offer a sooner appt per Erik's request  If the patient calls back please assist with scheduling on 11/2 at 1130am in the CV office

## 2022-10-06 NOTE — TELEPHONE ENCOUNTER
Received VM but could not understand message  Called pt back and he says that he is returning a call from [facility] regarding appt with Dr Ne Lujan  Attempted to make appt but unable due to hold status  Rosette - Pt is agreeable to date and time for CV location  Please schedule pt  Thank you!

## 2022-10-11 ENCOUNTER — APPOINTMENT (OUTPATIENT)
Dept: NEUROLOGY | Facility: CLINIC | Age: 77
End: 2022-10-11

## 2022-10-19 ENCOUNTER — TELEPHONE (OUTPATIENT)
Dept: NEUROLOGY | Facility: CLINIC | Age: 77
End: 2022-10-19

## 2022-11-02 ENCOUNTER — CONSULT (OUTPATIENT)
Dept: NEUROLOGY | Facility: CLINIC | Age: 77
End: 2022-11-02

## 2022-11-02 ENCOUNTER — TELEPHONE (OUTPATIENT)
Dept: NEUROLOGY | Facility: CLINIC | Age: 77
End: 2022-11-02

## 2022-11-02 VITALS
SYSTOLIC BLOOD PRESSURE: 162 MMHG | WEIGHT: 175.7 LBS | HEIGHT: 68 IN | TEMPERATURE: 98 F | HEART RATE: 76 BPM | BODY MASS INDEX: 26.63 KG/M2 | DIASTOLIC BLOOD PRESSURE: 70 MMHG

## 2022-11-02 DIAGNOSIS — I65.1 BASILAR ARTERY STENOSIS: Primary | ICD-10-CM

## 2022-11-02 DIAGNOSIS — I65.02 VERTEBRAL ARTERY OCCLUSION, LEFT: ICD-10-CM

## 2022-11-02 DIAGNOSIS — R51.9 HEADACHE: ICD-10-CM

## 2022-11-02 DIAGNOSIS — R93.0 ABNORMAL MRA, HEAD: ICD-10-CM
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
## 2022-11-02 NOTE — PATIENT INSTRUCTIONS
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:  Hudson Plunkett presents for an initial consultation with regard to incidentally discovered vertebral and basilar artery occlusions  This is most likely the result of atherosclerotic disease that has built up slowly over time  His neurologic exam is excellent in the office today and we do not see signs or symptoms concerning for vertebrobasilar insufficiency  Ultimately I would consider this to be incidental but it does place him at higher risk for stroke compared to the average person for which reason it is important that we use medication to lower that risk as much as possible   - for the time being he should remain on his combination of aspirin, Plavix, and Lipitor  -we will defer to his primary care team to monitor his cholesterol panel and blood sugar numbers but if it has not been checked in the last 6 months I would recommend a cholesterol panel to be done with a goal LDL cholesterol of less than 70  I would not decrease his Lipitor to less than 40 mg     - I would like him to occasionally check his blood pressure away from the doctor's office and to make sure that those numbers are consistently less than 130/80  If they are frequently higher he should contact his primary care team for adjustments however if he is on blood pressure medication or requires blood pressure medication over time care should be taken to slowly reduce blood pressure to make sure he does not have any loss of blood flow towards the back of the head   - we will plan to repeat a CT angiogram of the head and neck in 6 months and he will return to see me directly in 8 months time   -he does not have specific activity restrictions per se however if he notices that with prolonged neck extension or turning his head at extreme angles for prolonged time he starts to experience episodes such as vertigo, double vision, or passing out he should let our office know right away    He should avoid those activities if possible  If he were to have any stroke-like symptoms such as sudden painless loss of vision or double vision, difficulty speaking or swallowing, vertigo/ room spinning that does not quickly resolve, or weakness / numbness/loss of coordination affecting 1 side of the face or body he should proceed by ambulance to the nearest emergency room immediately

## 2022-11-02 NOTE — PROGRESS NOTES
HPI:     Nita Pisano is a very pleasant 76year old male who presents today to discuss stroke prevention in the setting of recent incidental finding of left vertebrobasilar artery occlusion  Nita Pisano was originally evaluated in June 2022 at an urgent care for a "lump" that had appeared next to his left ear  He was given Keflex and the lump resolved, however he was referred for a CT to demonstrate resolution of any residual soft tissue infection  CT incidentally showed absent enhancement in the V3 segment of the left vertebral artery  He was referred for an MRA that then demonstrated distal basilar artery stenosis  He was started on Aspirin by his PCP in July after the CT scan was completed and subsequently started on Plavix also by his PCP after the MRI was completed  He has remained on a statin for many years d/t hyperlipidemia  He was evaluated by neurosurgery on 8/31/22 who advised him that operating on the defect would lead to unreasonable increase in risk of stroke  He did not demonstrate any symptoms of basilar artery insufficiency at that time  He was referred to our care to discuss stroke prevention  Today (11/02/2022) he presents to clinic with his daughter and wife  He reports that his major concern at this time is frequent headaches that started in Dec 2021  The headaches occur in the frontal region around 3 times a week  The headaches usually resolve with Tylenol and he has not noticed any aggravating or exacerbating factors  He does have blurred vision that has been ongoing for many years in the setting of cataracts/gluacoma  His blurred vision does seem to worsen during his headaches  Pt denies any painless loss of vision, double vision, trouble speaking/swallowing, vertigo, weakness/numbness affecting 1 side of his face  He also denies any abnormal bleeding/bruising or any history of a clotting disorder  Overall there were no indications of basilar artery insufficiency at this time   He does note some occasional mild dizziness when he stands from a seated or laying position  Patient's wife reports he has had several episodes of transient confusion while navigating however no lasting memory deficits  He checks his blood pressure at home (only during headaches) and the systolic blood pressures have reportedly run up to the 170s  Patient feels that they don't run that high "normally", however has not been checking blood pressure outside of headache episodes  During this visit we reviewed his CTA together and he demonstrated his understanding of his condition  He was advised that there is not specific research regarding the time frame of treatment with dual platelet therapy in patients with a condition like his, and he should continue all of current medications for the time being  We also discussed the risk of bleeding on these medications and the patient verbalized his understanding  PHYSICAL EXAM:    Cranial Nerves      CN II   Right visual field deficit: none  Left visual field deficit: none   CN III, IV, VI   Pupils are equal, round, and reactive to light  Extraocular motions are normal    Nystagmus: none   Diplopia: none  Conjugate gaze: present     CN V   Facial sensation symmetric      CN VII   Facial expression full, symmetric       CN VIII   Hearing: intact     CN IX, X   Palate muscles symmetric, swallowing intact     CN XI   Shoulder shrug strong and symmetric     CN XII   Tongue movement symmetric     Motor Exam   Muscle bulk: symmetric       Strength   Strength 5/5 throughout and symmetric     Sensory Exam   Light touch symmetric

## 2022-11-02 NOTE — TELEPHONE ENCOUNTER
Patient is scheduled for CTA in May and was asking about the lab order needed prior for bloodwork  I did not see one in chart

## 2022-11-03 NOTE — TELEPHONE ENCOUNTER
Well he is quite right    normally I place those at the time of the order but may not have done so this time  Bun and Creat orders placed  Thanks

## 2023-04-28 ENCOUNTER — APPOINTMENT (OUTPATIENT)
Dept: LAB | Facility: CLINIC | Age: 78
End: 2023-04-28

## 2023-04-28 DIAGNOSIS — I65.02 VERTEBRAL ARTERY OCCLUSION, LEFT: ICD-10-CM

## 2023-04-28 DIAGNOSIS — R93.0 ABNORMAL MRA, HEAD: ICD-10-CM

## 2023-04-28 DIAGNOSIS — I65.1 BASILAR ARTERY STENOSIS: ICD-10-CM

## 2023-04-28 LAB
BUN SERPL-MCNC: 23 MG/DL (ref 5–25)
CREAT SERPL-MCNC: 1.13 MG/DL (ref 0.6–1.3)
GFR SERPL CREATININE-BSD FRML MDRD: 61 ML/MIN/1.73SQ M

## 2023-05-02 ENCOUNTER — HOSPITAL ENCOUNTER (OUTPATIENT)
Dept: CT IMAGING | Facility: HOSPITAL | Age: 78
Discharge: HOME | End: 2023-05-02
Attending: PSYCHIATRY & NEUROLOGY

## 2023-05-02 DIAGNOSIS — I65.1 BASILAR ARTERY STENOSIS: ICD-10-CM

## 2023-05-02 DIAGNOSIS — R51.9 HEADACHE: ICD-10-CM

## 2023-05-02 DIAGNOSIS — I65.02 VERTEBRAL ARTERY OCCLUSION, LEFT: ICD-10-CM

## 2023-05-02 RX ADMIN — IOHEXOL 85 ML: 350 INJECTION, SOLUTION INTRAVENOUS at 11:27

## 2023-05-11 ENCOUNTER — EMERGENCY (EMERGENCY)
Facility: HOSPITAL | Age: 78
LOS: 0 days | Discharge: HOME | End: 2023-05-11
Attending: STUDENT IN AN ORGANIZED HEALTH CARE EDUCATION/TRAINING PROGRAM
Payer: MEDICARE

## 2023-05-11 VITALS
WEIGHT: 169.98 LBS | HEIGHT: 68 IN | RESPIRATION RATE: 18 BRPM | HEART RATE: 79 BPM | OXYGEN SATURATION: 98 % | DIASTOLIC BLOOD PRESSURE: 90 MMHG | TEMPERATURE: 98 F | SYSTOLIC BLOOD PRESSURE: 187 MMHG

## 2023-05-11 DIAGNOSIS — Q64.79 OTHER CONGENITAL MALFORMATIONS OF BLADDER AND URETHRA: ICD-10-CM

## 2023-05-11 DIAGNOSIS — Z87.19 PERSONAL HISTORY OF OTHER DISEASES OF THE DIGESTIVE SYSTEM: ICD-10-CM

## 2023-05-11 DIAGNOSIS — I25.10 ATHEROSCLEROTIC HEART DISEASE OF NATIVE CORONARY ARTERY WITHOUT ANGINA PECTORIS: ICD-10-CM

## 2023-05-11 DIAGNOSIS — Z87.891 PERSONAL HISTORY OF NICOTINE DEPENDENCE: ICD-10-CM

## 2023-05-11 DIAGNOSIS — Z87.448 PERSONAL HISTORY OF OTHER DISEASES OF URINARY SYSTEM: ICD-10-CM

## 2023-05-11 DIAGNOSIS — E78.5 HYPERLIPIDEMIA, UNSPECIFIED: ICD-10-CM

## 2023-05-11 DIAGNOSIS — Z79.02 LONG TERM (CURRENT) USE OF ANTITHROMBOTICS/ANTIPLATELETS: ICD-10-CM

## 2023-05-11 DIAGNOSIS — R31.9 HEMATURIA, UNSPECIFIED: ICD-10-CM

## 2023-05-11 DIAGNOSIS — Z79.82 LONG TERM (CURRENT) USE OF ASPIRIN: ICD-10-CM

## 2023-05-11 LAB
ALBUMIN SERPL ELPH-MCNC: 4.7 G/DL — SIGNIFICANT CHANGE UP (ref 3.5–5.2)
ALP SERPL-CCNC: 88 U/L — SIGNIFICANT CHANGE UP (ref 30–115)
ALT FLD-CCNC: 27 U/L — SIGNIFICANT CHANGE UP (ref 0–41)
ANION GAP SERPL CALC-SCNC: 14 MMOL/L — SIGNIFICANT CHANGE UP (ref 7–14)
APPEARANCE UR: CLEAR — SIGNIFICANT CHANGE UP
AST SERPL-CCNC: 24 U/L — SIGNIFICANT CHANGE UP (ref 0–41)
BACTERIA # UR AUTO: NEGATIVE — SIGNIFICANT CHANGE UP
BASOPHILS # BLD AUTO: 0.13 K/UL — SIGNIFICANT CHANGE UP (ref 0–0.2)
BASOPHILS NFR BLD AUTO: 1.2 % — HIGH (ref 0–1)
BILIRUB SERPL-MCNC: 0.9 MG/DL — SIGNIFICANT CHANGE UP (ref 0.2–1.2)
BILIRUB UR-MCNC: NEGATIVE — SIGNIFICANT CHANGE UP
BUN SERPL-MCNC: 17 MG/DL — SIGNIFICANT CHANGE UP (ref 10–20)
CALCIUM SERPL-MCNC: 10.3 MG/DL — SIGNIFICANT CHANGE UP (ref 8.4–10.4)
CHLORIDE SERPL-SCNC: 106 MMOL/L — SIGNIFICANT CHANGE UP (ref 98–110)
CO2 SERPL-SCNC: 24 MMOL/L — SIGNIFICANT CHANGE UP (ref 17–32)
COLOR SPEC: YELLOW — SIGNIFICANT CHANGE UP
CREAT SERPL-MCNC: 1.1 MG/DL — SIGNIFICANT CHANGE UP (ref 0.7–1.5)
DIFF PNL FLD: ABNORMAL
EGFR: 68 ML/MIN/1.73M2 — SIGNIFICANT CHANGE UP
EOSINOPHIL # BLD AUTO: 0.2 K/UL — SIGNIFICANT CHANGE UP (ref 0–0.7)
EOSINOPHIL NFR BLD AUTO: 1.8 % — SIGNIFICANT CHANGE UP (ref 0–8)
EPI CELLS # UR: 0 /HPF — SIGNIFICANT CHANGE UP (ref 0–5)
GLUCOSE SERPL-MCNC: 101 MG/DL — HIGH (ref 70–99)
GLUCOSE UR QL: NEGATIVE — SIGNIFICANT CHANGE UP
HCT VFR BLD CALC: 47.1 % — SIGNIFICANT CHANGE UP (ref 42–52)
HGB BLD-MCNC: 15.9 G/DL — SIGNIFICANT CHANGE UP (ref 14–18)
HYALINE CASTS # UR AUTO: 0 /LPF — SIGNIFICANT CHANGE UP (ref 0–7)
IMM GRANULOCYTES NFR BLD AUTO: 0.4 % — HIGH (ref 0.1–0.3)
KETONES UR-MCNC: SIGNIFICANT CHANGE UP
LEUKOCYTE ESTERASE UR-ACNC: NEGATIVE — SIGNIFICANT CHANGE UP
LIDOCAIN IGE QN: 51 U/L — SIGNIFICANT CHANGE UP (ref 7–60)
LYMPHOCYTES # BLD AUTO: 2.81 K/UL — SIGNIFICANT CHANGE UP (ref 1.2–3.4)
LYMPHOCYTES # BLD AUTO: 25.9 % — SIGNIFICANT CHANGE UP (ref 20.5–51.1)
MCHC RBC-ENTMCNC: 29.7 PG — SIGNIFICANT CHANGE UP (ref 27–31)
MCHC RBC-ENTMCNC: 33.8 G/DL — SIGNIFICANT CHANGE UP (ref 32–37)
MCV RBC AUTO: 88 FL — SIGNIFICANT CHANGE UP (ref 80–94)
MONOCYTES # BLD AUTO: 0.8 K/UL — HIGH (ref 0.1–0.6)
MONOCYTES NFR BLD AUTO: 7.4 % — SIGNIFICANT CHANGE UP (ref 1.7–9.3)
NEUTROPHILS # BLD AUTO: 6.88 K/UL — HIGH (ref 1.4–6.5)
NEUTROPHILS NFR BLD AUTO: 63.3 % — SIGNIFICANT CHANGE UP (ref 42.2–75.2)
NITRITE UR-MCNC: NEGATIVE — SIGNIFICANT CHANGE UP
NRBC # BLD: 0 /100 WBCS — SIGNIFICANT CHANGE UP (ref 0–0)
PH UR: 6 — SIGNIFICANT CHANGE UP (ref 5–8)
PLATELET # BLD AUTO: 310 K/UL — SIGNIFICANT CHANGE UP (ref 130–400)
PMV BLD: 10.9 FL — HIGH (ref 7.4–10.4)
POTASSIUM SERPL-MCNC: 4.9 MMOL/L — SIGNIFICANT CHANGE UP (ref 3.5–5)
POTASSIUM SERPL-SCNC: 4.9 MMOL/L — SIGNIFICANT CHANGE UP (ref 3.5–5)
PROT SERPL-MCNC: 7.5 G/DL — SIGNIFICANT CHANGE UP (ref 6–8)
PROT UR-MCNC: SIGNIFICANT CHANGE UP
RBC # BLD: 5.35 M/UL — SIGNIFICANT CHANGE UP (ref 4.7–6.1)
RBC # FLD: 13.6 % — SIGNIFICANT CHANGE UP (ref 11.5–14.5)
RBC CASTS # UR COMP ASSIST: 93 /HPF — HIGH (ref 0–4)
SODIUM SERPL-SCNC: 144 MMOL/L — SIGNIFICANT CHANGE UP (ref 135–146)
SP GR SPEC: 1.02 — SIGNIFICANT CHANGE UP (ref 1.01–1.03)
UROBILINOGEN FLD QL: SIGNIFICANT CHANGE UP
WBC # BLD: 10.86 K/UL — HIGH (ref 4.8–10.8)
WBC # FLD AUTO: 10.86 K/UL — HIGH (ref 4.8–10.8)
WBC UR QL: 1 /HPF — SIGNIFICANT CHANGE UP (ref 0–5)

## 2023-05-11 PROCEDURE — 80053 COMPREHEN METABOLIC PANEL: CPT

## 2023-05-11 PROCEDURE — 74177 CT ABD & PELVIS W/CONTRAST: CPT | Mod: MA

## 2023-05-11 PROCEDURE — 74177 CT ABD & PELVIS W/CONTRAST: CPT | Mod: 26,MA

## 2023-05-11 PROCEDURE — 85025 COMPLETE CBC W/AUTO DIFF WBC: CPT

## 2023-05-11 PROCEDURE — 99284 EMERGENCY DEPT VISIT MOD MDM: CPT | Mod: FS

## 2023-05-11 PROCEDURE — 99284 EMERGENCY DEPT VISIT MOD MDM: CPT | Mod: 25

## 2023-05-11 PROCEDURE — 87086 URINE CULTURE/COLONY COUNT: CPT

## 2023-05-11 PROCEDURE — 83690 ASSAY OF LIPASE: CPT

## 2023-05-11 PROCEDURE — 81001 URINALYSIS AUTO W/SCOPE: CPT

## 2023-05-11 PROCEDURE — 36415 COLL VENOUS BLD VENIPUNCTURE: CPT

## 2023-05-11 NOTE — ED ADULT TRIAGE NOTE - CHIEF COMPLAINT QUOTE
pt ambulatory to ED, hematuria x 4 days, noted 1 clot yesterday, dysuria.  hx vertebral artery occlusion and basilar artery occlusion as per wife's papers, and enlarged prostate.

## 2023-05-11 NOTE — ED PROVIDER NOTE - OBJECTIVE STATEMENT
76 yo male with a pmh of hld, cad, and bph presents c/o painless hematuria for 3 days. pt denies any other symptoms including fevers, chill, headache, recent illness/travel, cough, abdominal pain, chest pain, or SOB.

## 2023-05-11 NOTE — ED PROVIDER NOTE - CARE PROVIDERS DIRECT ADDRESSES
,[email or web address]

## 2023-05-11 NOTE — ED ADULT NURSE NOTE - NSFALLUNIVINTERV_ED_ALL_ED
Bed/Stretcher in lowest position, wheels locked, appropriate side rails in place/Call bell, personal items and telephone in reach/Instruct patient to call for assistance before getting out of bed/chair/stretcher/Non-slip footwear applied when patient is off stretcher/Christine to call system/Physically safe environment - no spills, clutter or unnecessary equipment/Purposeful proactive rounding/Room/bathroom lighting operational, light cord in reach

## 2023-05-11 NOTE — ED PROVIDER NOTE - NSFOLLOWUPINSTRUCTIONS_ED_ALL_ED_FT
Our Emergency Department Referral Coordinators will be reaching out to you in the next 24-48 hours from 9:00am to 5:00pm with a follow up appointment. Please expect a phone call from the hospital in that time frame. If you do not receive a call or if you have any questions or concerns, you can reach them at   [phone]    Hematuria    Hematuria is blood in your urine. It can be caused by a bladder infection, kidney infection, prostate infection, kidney stone, or cancer of your urinary tract. Infections can usually be treated with medicine, and a kidney stone usually will pass through your urine. If neither of these is the cause of your hematuria, further workup to find out the reason may be needed.    It is very important that you tell your health care provider about any blood you see in your urine, even if the blood stops without treatment or happens without causing pain. Blood in your urine that happens and then stops and then happens again can be a symptom of a very serious condition. Also, pain is not a symptom in the initial stages of many urinary cancers.     HOME CARE INSTRUCTIONS  Drink lots of fluid, 3–4 quarts a day. If you have been diagnosed with an infection, cranberry juice is especially recommended, in addition to large amounts of water.  Avoid caffeine, tea, and carbonated beverages because they tend to irritate the bladder.  Avoid alcohol because it may irritate the prostate.  Take all medicines as directed by your health care provider.   If you were prescribed an antibiotic medicine, finish it all even if you start to feel better.  If you have been diagnosed with a kidney stone, follow your health care provider's instructions regarding straining your urine to catch the stone.   Empty your bladder often. Avoid holding urine for long periods of time.  After a bowel movement, women should cleanse front to back. Use each tissue only once.  Empty your bladder before and after sexual intercourse if you are a female.     SEEK MEDICAL CARE IF:  You develop back pain.  You have a fever.  You have a feeling of sickness in your stomach (nausea) or vomiting.  Your symptoms are not better in 3 days. Return sooner if you are getting worse.     SEEK IMMEDIATE MEDICAL CARE IF:  You develop severe vomiting and are unable to keep the medicine down.  You develop severe back or abdominal pain despite taking your medicines.  You begin passing a large amount of blood or clots in your urine.  You feel extremely weak or faint, or you pass out.    MAKE SURE YOU:  Understand these instructions.   Will watch your condition.  Will get help right away if you are not doing well or get worse.

## 2023-05-11 NOTE — ED PROVIDER NOTE - PATIENT PORTAL LINK FT
You can access the FollowMyHealth Patient Portal offered by [facility] by registering at the following website: [email or web address]. By joining [facility]’s FollowMyHealth portal, you will also be able to view your health information using other applications (apps) compatible with our system.

## 2023-05-11 NOTE — ED PROVIDER NOTE - CARE PROVIDER_API CALL
Christian Ponce)  Urology  [address] [place] [id number]  Phone: [phone]  Fax: [phone]  Follow Up Time:     James Hooper)  Urology  [address] [place] [address]  Phone: [phone]  Fax: [phone]  Follow Up Time:

## 2023-05-11 NOTE — ED PROVIDER NOTE - PHYSICAL EXAMINATION
Gen: NAD, AOx3  Head: NCAT  HEENT: PERRL, oral mucosa moist, normal conjunctiva  Lung: CTAB, no respiratory distress, no wheezing, rales, rhonchi  CV: normal s1/s2, rrr, Normal perfusion, pulses 2+ throughout  Abd: soft, NTND, no CVA tenderness  Genitourinary: no pelvic tenderness  MSK: No edema, no visible deformities, full range of motion in all 4 extremities  Neuro: No focal neurologic deficits  Skin: No rash   Psych: normal affect

## 2023-05-11 NOTE — ED PROVIDER NOTE - CLINICAL SUMMARY MEDICAL DECISION MAKING FREE TEXT BOX
77-year-old male with history of vertebral artery occlusion on aspirin and Plavix, GERD, hyperlipidemia presenting today with painless hematuria for the last 3 days.  Endorses 2-3 episodes of bright red bloody urine, no dysuria or fevers.  Denies any abdominal pain or back pain.  States that he always has issues urinating secondary to enlarged prostate however no changes from baseline. Labs unremarkable, urinalysis with no UTI however noted to have blood.  Patient urinating without issue in the ED, states that the bleeding has improved.  CT with possible mass in the bladder.  Results discussed in detail with the patient as well as his family, will give rapid referral to urology for further management.  Return precautions discussed in detail with the family including difficulty urinating, signs of hemorrhage, and or any other concerning symptoms to return to the ED for reevaluation.

## 2023-05-11 NOTE — ED PROVIDER NOTE - ATTENDING APP SHARED VISIT CONTRIBUTION OF CARE
77-year-old male with history of vertebral artery occlusion on aspirin and Plavix, GERD, hyperlipidemia presenting today with painless hematuria for the last 3 days.  Endorses 2-3 episodes of bright red bloody urine, no dysuria or fevers.  Denies any abdominal pain or back pain.  States that he always has issues urinating secondary to enlarged prostate however no changes from baseline.    CONSTITUTIONAL: Well-developed; well-nourished; in no acute distress.   SKIN: warm, dry  HEAD: Normocephalic; atraumatic.  EYES: PERRL, EOMI, no conjunctival erythema  ENT: No nasal discharge; airway clear.  NECK: Supple; non tender.  CARD: S1, S2 normal; no murmurs, gallops, or rubs. Regular rate and rhythm.   RESP: No wheezes, rales or rhonchi.  ABD: soft ntnd. no cva tenderness.   EXT: Normal ROM.  No clubbing, cyanosis or edema.   NEURO: Alert, oriented, grossly unremarkable  PSYCH: Cooperative, appropriate.

## 2023-05-12 LAB
CULTURE RESULTS: SIGNIFICANT CHANGE UP
SPECIMEN SOURCE: SIGNIFICANT CHANGE UP

## 2023-07-17 ENCOUNTER — OFFICE VISIT (OUTPATIENT)
Dept: NEUROLOGY | Facility: CLINIC | Age: 78
End: 2023-07-17

## 2023-07-17 VITALS
BODY MASS INDEX: 26.86 KG/M2 | DIASTOLIC BLOOD PRESSURE: 78 MMHG | SYSTOLIC BLOOD PRESSURE: 132 MMHG | WEIGHT: 177.2 LBS | TEMPERATURE: 98.3 F | HEIGHT: 68 IN | HEART RATE: 87 BPM

## 2023-07-17 DIAGNOSIS — I65.02 VERTEBRAL ARTERY OCCLUSION, LEFT: ICD-10-CM

## 2023-07-17 DIAGNOSIS — I65.1 BASILAR ARTERY STENOSIS: Primary | ICD-10-CM

## 2023-07-17 NOTE — ASSESSMENT & PLAN NOTE
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 His neurologic exam is excellent in the office today and there are no signs or symptoms concerning for vertebrobasilar insufficiency. His repeat CT angiogram of the head and neck on 5/2/23 was stable with no interval change or progression while on DAPT (ASA 81 mg and Plavix 75 mg daily) and Atorvastatin 80 mg. He was advised that there is no specific research regarding the time frame of treatment with dual platelet therapy in patients with a condition like his, however given his CTA stability, lack of symptoms concerning for vertebrobasilar insufficiency and his concerns that Plavix is causing fatigue, we did agree that we can plan to discontinue Plavix at this time and continue Aspirin 81 mg monotherapy + Atorvastatin 80 mg daily and plan to repeat his CTA head and neck wwo in 6 months; sooner if clinically indicated. Alternatively, he was also given the option to continue on DAPT+Atorvastatin for another 6 months but did discuss the risk of bleeding. Ultimately, he did decide to discontinue Plavix. Plan:     - Discontinue Plavix (Clopidogrel)  - Continue on Aspirin 81 mg daily; you may stop this for 5-7 days prior to your procedure (prostate biopsy) if requested by your surgeon. If they are okay with you continuing Aspirin during this time then please do so ideally. - Continue Atorvastatin 80 mg daily  - Repeat CTA head and neck wwo in December/January 2024; blood work prior  - Follow up 2 weeks after CTA scan in office to review  - Continue with good blood pressure control; I would recommend monitoring at home at least 3 times per week; Goal of <130/80  - Notify the office of you start to experience episodes such as vertigo, double vision, or passing out.     - You should avoid prolonged neck extension or turning of your head at extreme angles if at all possible. I will plan for him to return to the office in 6 months time but would be happy to see him sooner if the need should arise. If he has any symptoms concerning for TIA or stroke including sudden painless loss of vision or double vision, difficulty speaking or swallowing, vertigo/room spinning that does not quickly resolve, or weakness/numbness affecting 1 side of the face or body he should proceed by ambulance to the nearest emergency room immediately.

## 2023-07-17 NOTE — ASSESSMENT & PLAN NOTE
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 His neurologic exam is excellent in the office today and there are no signs or symptoms concerning for vertebrobasilar insufficiency. His repeat CT angiogram of the head and neck on 5/2/23 was stable with no interval change or progression while on DAPT (ASA 81 mg and Plavix 75 mg daily) and Atorvastatin 80 mg. He was advised that there is no specific research regarding the time frame of treatment with dual platelet therapy in patients with a condition like his, however given his CTA stability, lack of symptoms concerning for vertebrobasilar insufficiency and his concerns that Plavix is causing fatigue, we did agree that we can plan to discontinue Plavix at this time and continue Aspirin 81 mg monotherapy + Atorvastatin 80 mg daily and plan to repeat his CTA head and neck wwo in 6 months; sooner if clinically indicated. Alternatively, he was also given the option to continue on DAPT+Atorvastatin for another 6 months but did discuss the risk of bleeding. Ultimately, he did decide to discontinue Plavix. Plan:     - Discontinue Plavix (Clopidogrel)  - Continue on Aspirin 81 mg daily; you may stop this for 5-7 days prior to your procedure (prostate biopsy) if requested by your surgeon. If they are okay with you continuing Aspirin during this time then please do so ideally. - Continue Atorvastatin 80 mg daily  - Repeat CTA head and neck wwo in December/January 2024; blood work prior  - Follow up 2 weeks after CTA scan in office to review  - Continue with good blood pressure control; I would recommend monitoring at home at least 3 times per week; Goal of <130/80  - Notify the office of you start to experience episodes such as vertigo, double vision, or passing out.     - You should avoid prolonged neck extension or turning of your head at extreme angles if at all possible. I will plan for him to return to the office in 6 months time but would be happy to see him sooner if the need should arise. If he has any symptoms concerning for TIA or stroke including sudden painless loss of vision or double vision, difficulty speaking or swallowing, vertigo/room spinning that does not quickly resolve, or weakness/numbness affecting 1 side of the face or body he should proceed by ambulance to the nearest emergency room immediately.

## 2023-07-17 NOTE — PROGRESS NOTES
Patient ID: Angela Crowe is a 76 y.o. male. 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 His neurologic exam is excellent in the office today and there are no signs or symptoms concerning for vertebrobasilar insufficiency. His repeat CT angiogram of the head and neck on 5/2/23 was stable with no interval change or progression while on DAPT (ASA 81 mg and Plavix 75 mg daily) and Atorvastatin 80 mg. He was advised that there is no specific research regarding the time frame of treatment with dual platelet therapy in patients with a condition like his, however given his CTA stability, lack of symptoms concerning for vertebrobasilar insufficiency and his concerns that Plavix is causing fatigue, we did agree that we can plan to discontinue Plavix at this time and continue Aspirin 81 mg monotherapy + Atorvastatin 80 mg daily and plan to repeat his CTA head and neck wwo in 6 months; sooner if clinically indicated. Alternatively, he was also given the option to continue on DAPT+Atorvastatin for another 6 months but did discuss the risk of bleeding. Ultimately, he did decide to discontinue Plavix. Plan:     - Discontinue Plavix (Clopidogrel)  - Continue on Aspirin 81 mg daily; you may stop this for 5-7 days prior to your procedure (prostate biopsy) if requested by your surgeon. If they are okay with you continuing Aspirin during this time then please do so ideally.   - Continue Atorvastatin 80 mg daily  - Repeat CTA head and neck wwo in December/January 2024; blood work prior  - Follow up 2 weeks after CTA scan in office to review  - Continue with good blood pressure control; I would recommend monitoring at home at least 3 times per week; Goal of <130/80  - Notify the office of you start to experience episodes such as vertigo, double vision, or passing out. - You should avoid prolonged neck extension or turning of your head at extreme angles if at all possible. I will plan for him to return to the office in 6 months time but would be happy to see him sooner if the need should arise. If he has any symptoms concerning for TIA or stroke including sudden painless loss of vision or double vision, difficulty speaking or swallowing, vertigo/room spinning that does not quickly resolve, or weakness/numbness affecting 1 side of the face or body he should proceed by ambulance to the nearest emergency room immediately. 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 His neurologic exam is excellent in the office today and there are no signs or symptoms concerning for vertebrobasilar insufficiency. His repeat CT angiogram of the head and neck on 5/2/23 was stable with no interval change or progression while on DAPT (ASA 81 mg and Plavix 75 mg daily) and Atorvastatin 80 mg. He was advised that there is no specific research regarding the time frame of treatment with dual platelet therapy in patients with a condition like his, however given his CTA stability, lack of symptoms concerning for vertebrobasilar insufficiency and his concerns that Plavix is causing fatigue, we did agree that we can plan to discontinue Plavix at this time and continue Aspirin 81 mg monotherapy + Atorvastatin 80 mg daily and plan to repeat his CTA head and neck wwo in 6 months; sooner if clinically indicated. Alternatively, he was also given the option to continue on DAPT+Atorvastatin for another 6 months but did discuss the risk of bleeding. Ultimately, he did decide to discontinue Plavix.     Plan:     - Discontinue Plavix (Clopidogrel)  - Continue on Aspirin 81 mg daily; you may stop this for 5-7 days prior to your procedure (prostate biopsy) if requested by your surgeon. If they are okay with you continuing Aspirin during this time then please do so ideally. - Continue Atorvastatin 80 mg daily  - Repeat CTA head and neck wwo in December/January 2024; blood work prior  - Follow up 2 weeks after CTA scan in office to review  - Continue with good blood pressure control; I would recommend monitoring at home at least 3 times per week; Goal of <130/80  - Notify the office of you start to experience episodes such as vertigo, double vision, or passing out. - You should avoid prolonged neck extension or turning of your head at extreme angles if at all possible. I will plan for him to return to the office in 6 months time but would be happy to see him sooner if the need should arise. If he has any symptoms concerning for TIA or stroke including sudden painless loss of vision or double vision, difficulty speaking or swallowing, vertigo/room spinning that does not quickly resolve, or weakness/numbness affecting 1 side of the face or body he should proceed by ambulance to the nearest emergency room immediately. Diagnoses and all orders for this visit:    Basilar artery stenosis  -     CTA head and neck w wo contrast; Future  -     Comprehensive metabolic panel; Future    Vertebral artery occlusion, left  -     CTA head and neck w wo contrast; Future  -     Comprehensive metabolic panel; Future       I have spent a total time of 60 minutes on 07/17/23 in caring for this patient including Diagnostic results, Prognosis, Risks and benefits of tx options, Instructions for management, Patient and family education, Importance of tx compliance, Risk factor reductions, Impressions, Counseling / Coordination of care, Documenting in the medical record, Reviewing / ordering tests, medicine, procedures   and Obtaining or reviewing history  .         Subjective:    BEATA Ferrari is a 76year old male who presents to the office to follow up on his incidentally discovered vertebral and basilar artery occlusions. He was last seen by Dr. Floridalma Fuentes 11/2/22. He continues on Aspirin, Plavix and Lipitor for stroke prevention. He has been maintained on DAPT since August 2022 (began ASA in July 2022, then added Plavix August 2022). Since he was last seen he did complete a repeat CT angiogram of the head and neck on 5/2/23 which was stable with no interval change or progression. He continues to deny any vertigo, double vision, or syncope. He denies any excessive bruising or bleeding. He denies any LOC, syncope, dizziness, or lightheadedness. He denies any changes in vision including double or blurred vision. His biggest concern is that he feels since initiating Plavix he is more easily fatigued with any type of exertion which is not usual for him. He denies any chest pain, palpitations or shortness of breath. He states overall his headaches have improved. He previously reported headaches 3 times per week, now stating they are once every 1-2 weeks instead. He is using Tylenol very rarely. He denies any associated symptoms with his headaches and describes the location as being bilateral frontal. He denies any further blurred vision since his cataract surgery. He recently has had labs drawn 6/22/23 at [facility], not available in his chart to review however he did show me the results from his patient portal on his cell phone: Total cholesterol 152, hdl 53, trig 112, ldl 79  egfr 65  hgba1c 5.6%    He denies any new or worsening neurologic complaints. He does have an upcoming prostate biopsy and was asked to hold his Plavix for 5 days, he was told he can continue ASA 81 mg and Atorvastatin. The following portions of the patient's history were reviewed and updated as appropriate: allergies, current medications, past family history, past medical history, past social history and past surgical history.      Objective:    Blood pressure 132/78, pulse 87, temperature 98.3 °F (36.8 °C), temperature source Temporal, height 5' 8" (1.727 m), weight 80.4 kg (177 lb 3.2 oz). Neurological Exam    On neurological examination patient is alert, awake, oriented and in no distress. Speech is fluent without dysarthria or aphasia. Cranial nerves 2-12 were symmetrically intact bilaterally. No evidence of any focal weakness or sensory loss in the upper or lower extremities. Motor testing reveals 5/5 strength of the bilateral upper and lower extremities. There was no pronator drift. No fasciculations present. No abnormal involuntary movements. Finger- to-nose reveals no tremor or ataxia and intact proprioceptive function, no dysmetria was noted. Rapid alternating movement normal. Sensation was intact to vibration, light touch, and temperature in bilateral upper and lower extremities. Deep tendon reflexes were 2+ and symmetric in the bilateral upper and lower extremities. He is able to rise easily without assistance from a seated position. Casual gait is normal including stance, stride, and arm swing. Normal tandem gait. Romberg is absent. ROS:    Review of Systems   Constitutional: Negative for appetite change, fatigue and fever. HENT: Negative. Negative for hearing loss, tinnitus, trouble swallowing and voice change. Eyes: Negative. Negative for photophobia, pain and visual disturbance. Respiratory: Negative. Negative for shortness of breath. Cardiovascular: Negative. Negative for palpitations. Gastrointestinal: Negative. Negative for nausea and vomiting. Endocrine: Negative. Negative for cold intolerance. Genitourinary: Negative. Negative for dysuria, frequency and urgency. Musculoskeletal: Negative for back pain, gait problem, myalgias and neck pain. Skin: Negative. Negative for rash. Allergic/Immunologic: Negative. Neurological: Negative.   Negative for dizziness, tremors, seizures, syncope, facial asymmetry, speech difficulty, weakness, light-headedness, numbness and headaches. Hematological: Negative. Does not bruise/bleed easily. Psychiatric/Behavioral: Negative. Negative for confusion, hallucinations and sleep disturbance. Reviewed ROS as entered by medical assistant.

## 2023-07-17 NOTE — PATIENT INSTRUCTIONS
- Discontinue Plavix (Clopidogrel)  - Continue on Aspirin 81 mg daily; you may stop this for 5-7 days prior to your procedure (prostate biopsy) if requested by your surgeon. If they are okay with you continuing Aspirin during this time then please do so ideally. - Continue Atorvastatin 80 mg daily  - Repeat CTA head and neck wwo in December/January 2024; blood work prior  - Follow up 2 weeks after CTA scan in office to review  - Continue with good blood pressure control; I would recommend monitoring at home at least 3 times per week; Goal of <130/80  - Notify the office of you start to experience episodes such as vertigo, double vision, or passing out. - You should avoid prolonged neck extension or turning of your head at extreme angles if at all possible. I will plan for him to return to the office in 6 months time but would be happy to see him sooner if the need should arise. If he has any symptoms concerning for TIA or stroke including sudden painless loss of vision or double vision, difficulty speaking or swallowing, vertigo/room spinning that does not quickly resolve, or weakness/numbness affecting 1 side of the face or body he should proceed by ambulance to the nearest emergency room immediately.

## 2023-08-25 ENCOUNTER — PATIENT MESSAGE (OUTPATIENT)
Dept: NEUROLOGY | Facility: CLINIC | Age: 78
End: 2023-08-25

## 2023-08-25 DIAGNOSIS — R51.9 HEADACHE: ICD-10-CM

## 2023-08-25 DIAGNOSIS — I65.02 VERTEBRAL ARTERY OCCLUSION, LEFT: ICD-10-CM

## 2023-08-25 DIAGNOSIS — R93.0 ABNORMAL MRA, HEAD: ICD-10-CM

## 2023-08-25 DIAGNOSIS — I65.1 BASILAR ARTERY STENOSIS: Primary | ICD-10-CM

## 2023-08-25 DIAGNOSIS — H53.8 BLURRED VISION: ICD-10-CM

## 2023-09-02 LAB
ALBUMIN SERPL-MCNC: 4.2 G/DL (ref 3.6–5.1)
ALBUMIN/GLOB SERPL: 1.6 (CALC) (ref 1–2.5)
ALP SERPL-CCNC: 69 U/L (ref 35–144)
ALT SERPL-CCNC: 18 U/L (ref 9–46)
AST SERPL-CCNC: 16 U/L (ref 10–35)
BILIRUB SERPL-MCNC: 0.9 MG/DL (ref 0.2–1.2)
BUN SERPL-MCNC: 19 MG/DL (ref 7–25)
BUN/CREAT SERPL: NORMAL (CALC) (ref 6–22)
CALCIUM SERPL-MCNC: 9.6 MG/DL (ref 8.6–10.3)
CHLORIDE SERPL-SCNC: 107 MMOL/L (ref 98–110)
CO2 SERPL-SCNC: 28 MMOL/L (ref 20–32)
CREAT SERPL-MCNC: 1.22 MG/DL (ref 0.7–1.22)
GFR/BSA.PRED SERPLBLD CYS-BASED-ARV: 60 ML/MIN/1.73M2
GLOBULIN SER CALC-MCNC: 2.7 G/DL (CALC) (ref 1.9–3.7)
GLUCOSE SERPL-MCNC: 95 MG/DL (ref 65–139)
POTASSIUM SERPL-SCNC: 4.6 MMOL/L (ref 3.5–5.3)
PROT SERPL-MCNC: 6.9 G/DL (ref 6.1–8.1)
SODIUM SERPL-SCNC: 142 MMOL/L (ref 135–146)

## 2023-09-07 ENCOUNTER — HOSPITAL ENCOUNTER (OUTPATIENT)
Dept: CT IMAGING | Facility: HOSPITAL | Age: 78
Discharge: HOME | End: 2023-09-07
Payer: MEDICARE

## 2023-09-07 DIAGNOSIS — H53.8 BLURRED VISION: ICD-10-CM

## 2023-09-07 DIAGNOSIS — I65.02 VERTEBRAL ARTERY OCCLUSION, LEFT: ICD-10-CM

## 2023-09-07 DIAGNOSIS — I65.1 BASILAR ARTERY STENOSIS: ICD-10-CM

## 2023-09-07 DIAGNOSIS — R51.9 HEADACHE: ICD-10-CM

## 2023-09-07 DIAGNOSIS — R93.0 ABNORMAL MRA, HEAD: ICD-10-CM

## 2023-09-07 PROCEDURE — G1004 CDSM NDSC: HCPCS

## 2023-09-07 PROCEDURE — 70496 CT ANGIOGRAPHY HEAD: CPT

## 2023-09-07 PROCEDURE — 70498 CT ANGIOGRAPHY NECK: CPT

## 2023-09-07 RX ADMIN — IOHEXOL 80 ML: 350 INJECTION, SOLUTION INTRAVENOUS at 16:08

## 2023-09-13 ENCOUNTER — PATIENT MESSAGE (OUTPATIENT)
Dept: NEUROLOGY | Facility: CLINIC | Age: 78
End: 2023-09-13

## 2023-09-20 NOTE — ED ADULT NURSE NOTE - NSFALLRISKASMTTYPE_ED_ALL_ED
Increase fiber and fluids. Sit on toilet after meals for 5 minutes. Handout given.    Initial (On Arrival)

## 2023-09-28 ENCOUNTER — TELEPHONE (OUTPATIENT)
Dept: NEUROLOGY | Facility: CLINIC | Age: 78
End: 2023-09-28

## 2023-09-28 NOTE — TELEPHONE ENCOUNTER
September 14, 2023  Laura Nguyen, [address]  to Noemi Peguero and proxy Elle Gallo)          9/14/23  7:31 AM  Ja Reyes,  If you'd like you can resume Plavix and stop Aspirin instead and see if there is any difference in headaches.  Will you need a new prescription?     Kanu Goode, [address], FNP-C  Neurology     Last read by Noemi Peguero at  5:47 PM on 9/16/

## 2023-09-28 NOTE — TELEPHONE ENCOUNTER
September 13, 2023  Georges Smith (proxy for Braxton Cruz)  to [address] [place] [address] Clinical Team 5 (supporting Elisabeth Espinal MD)          9/13/23  2:58 PM  This message is being sent by Georges Smith on behalf of Braxton Cruz.     I know the CT came back with no changes. Although since stopping the Plavix I have headaches, bright light bothers my eyes. This started after stopping Plavix. Should I continue to use Plavix. I never use to get headraces till this problem started with artery . Please Advise      Thank you  Lorie Shine.

## 2023-10-20 ENCOUNTER — TELEPHONE (OUTPATIENT)
Dept: NEUROLOGY | Facility: CLINIC | Age: 78
End: 2023-10-20

## 2023-10-20 NOTE — TELEPHONE ENCOUNTER
Hello Good Morning,     Patient was scheduled with Laura and he cancelled via [employer]. I then called to help re-scheduled his appt with Laura but he asking to be scheduled only with Christiano Correia in December or the first week of January as [place] from [place]15[place]24-03/20224. CTA was completed on 09/7/23. Can you please advise which date and time we can offer as I don't see any regular f/u appts open.         Thank you for all your help,     Tom Limon

## 2023-10-20 NOTE — TELEPHONE ENCOUNTER
Return in about 28 weeks (around 1/29/2024). Based on the last office note patient is due for follow up end of January. You can schedule him first available based on the office notes unless of course he has new concerns that need to be addressed sooner.

## 2024-01-02 NOTE — ED PROVIDER NOTE - PROGRESS NOTE DETAILS
Plan: CT, EKG, labs, and reassess.
I attest my time as attending is greater than 50% of the total combined time spent on qualifying patient care activities by the PA/NP and attending.

## 2024-03-12 ENCOUNTER — TELEPHONE (OUTPATIENT)
Dept: NEUROLOGY | Facility: CLINIC | Age: 79
End: 2024-03-12

## 2024-03-12 NOTE — TELEPHONE ENCOUNTER
LMOM to confirm appointment on 03/19/24 at 1 PM with Dr. Valencia in [place]. Callback number left.

## 2024-05-31 ENCOUNTER — OFFICE VISIT (OUTPATIENT)
Dept: NEUROLOGY | Facility: CLINIC | Age: 79
End: 2024-05-31
Payer: MEDICARE

## 2024-05-31 VITALS
WEIGHT: 177.9 LBS | TEMPERATURE: 97.6 F | BODY MASS INDEX: 26.96 KG/M2 | HEIGHT: 68 IN | HEART RATE: 71 BPM | OXYGEN SATURATION: 99 % | SYSTOLIC BLOOD PRESSURE: 121 MMHG | DIASTOLIC BLOOD PRESSURE: 73 MMHG

## 2024-05-31 DIAGNOSIS — I65.1 BASILAR ARTERY STENOSIS: Primary | ICD-10-CM

## 2024-05-31 DIAGNOSIS — M54.2 CERVICALGIA: ICD-10-CM

## 2024-05-31 DIAGNOSIS — I65.02 VERTEBRAL ARTERY OCCLUSION, LEFT: ICD-10-CM

## 2024-05-31 PROCEDURE — 99214 OFFICE O/P EST MOD 30 MIN: CPT | Performed by: PSYCHIATRY & NEUROLOGY

## 2024-05-31 RX ORDER — OMEGA-3S/DHA/EPA/FISH OIL/D3 300MG-1000
400 CAPSULE ORAL DAILY
COMMUNITY

## 2024-05-31 RX ORDER — ALFUZOSIN HYDROCHLORIDE 10 MG/1
TABLET, EXTENDED RELEASE ORAL
COMMUNITY
Start: 2024-05-29

## 2024-05-31 RX ORDER — DUTASTERIDE 0.5 MG/1
CAPSULE, LIQUID FILLED ORAL
COMMUNITY
Start: 2024-04-17

## 2024-05-31 RX ORDER — CLOPIDOGREL BISULFATE 75 MG/1
TABLET ORAL
COMMUNITY
Start: 2024-05-06

## 2024-05-31 NOTE — PROGRESS NOTES
Patient ID: Cristhian Mayfield is a 78 y.o. male who presents to the [facility]    Assessment/Plan:   Patient Instructions   Basilar artery occlusion/stenosis: Anam presents for follow-up evaluation with regard to his left vertebral artery occlusion and basilar artery stenosis/occlusion.  He reports no new strokelike symptoms.  He is taking his medicine as prescribed.  At the time of his last visit in the office clopidogrel was stopped.  Although I agree that this was appropriate he noticed a clear increase in his headaches thereafter and those headaches reduced in severity once the clopidogrel was restarted.  He remains on both aspirin and clopidogrel at this time with no significant bleeding or bruising issues.  -We will plan to repeat a CT angiogram of the head and neck over the course of the next few weeks to few months at his convenience  -For the time being we will plan to continue his combination of aspirin, clopidogrel, and statin with regard to overall vascular stability and stroke prevention  -We will defer to his primary care team for monitoring of his cholesterol panel and blood sugar numbers  -He should occasionally check his blood pressure away from the doctor's office and make sure the numbers are usually less than 130/80    Cervicalgia/headache: Anam reports that he is actually experiencing left occipital headaches 3 to 4 days out of the week.  He is not having additional migrainous features.  He does have some reduced range of motion in his neck, and this may be the source of his discomfort  -We will plan for an initial course of physical therapy to treat headache/neck discomfort  -Depending on the results of the physical therapy consideration will be given to adding a medication for headache prevention  -He can continue to use Tylenol at his discretion for treatment of headache.  He could also consider using a lidocaine gel rubbed into the area of discomfort.    From my standpoint he is  doing exceptionally well.  I would like for him to return to see me directly in 8 months time but we would be happy to see him sooner if the need should arise.  If he were to have new strokelike symptoms such as sudden painless loss of vision or double vision, difficulty speaking or swallowing, vertigo/room spinning that does not quickly resolve, or sudden weakness/numbness/loss of coordination affecting 1 side of the face or body he should proceed by ambulance to the nearest emergency room immediately.  If he completes a course of physical therapy and finds that his headaches are not improving or resolving I would like for him to contact our office and we will take next steps for headache prevention, he should not wait until his next office visit.       Diagnoses and all orders for this visit:    Basilar artery stenosis  -     CTA head and neck w wo contrast; Future  -     BUN; Future  -     Creatinine, serum; Future    Vertebral artery occlusion, left  -     CTA head and neck w wo contrast; Future  -     BUN; Future  -     Creatinine, serum; Future    Cervicalgia  -     Ambulatory Referral to Physical Therapy; Future    Other orders  -     alfuzosin (UROXATRAL) 10 mg 24 hr tablet  -     dutasteride (AVODART) 0.5 mg capsule  -     clopidogrel (PLAVIX) 75 mg tablet  -     cholecalciferol (VITAMIN D3) 400 units tablet; Take 400 Units by mouth daily           Subjective:    BEATA Mayfield is a 78 y.o. male who presents for follow-up.  He reports no new strokelike symptoms   Or symptoms concerning for vertebrobasilar insufficiency.  I was able to directly review his CT angiogram which I agree appeared stable compared to the most recent study.  Subsequent to his last visit in the office clopidogrel was discontinued which I agree was appropriate.  Subsequently he noted a clear increase in his headaches and clopidogrel was restarted as a result.  He reports no significant bleeding or bruising issues.    He notes  "that he has been having headaches for a few years now but they have gotten worse recently.  In particular he is experiencing headache roughly 3 to 4 days/week.  The pain is 5/10 in intensity and impact to the left superior cervical area close to the region of the left greater occipital nerve.  He does not endorse migraine-like symptoms and is not experiencing burning paresthesias or dysesthesia in the area, he also denies allodynia.      Current Outpatient Medications:   •  alfuzosin (UROXATRAL) 10 mg 24 hr tablet, , Disp: , Rfl:   •  aspirin (ECOTRIN LOW STRENGTH) 81 mg EC tablet, Take 81 mg by mouth daily, Disp: , Rfl:   •  atorvastatin (LIPITOR) 80 mg tablet, TAKE 1 TABLET BY MOUTH EVERY 24 HOURS IN THE EVENING, Disp: , Rfl:   •  cholecalciferol (VITAMIN D3) 400 units tablet, Take 400 Units by mouth daily, Disp: , Rfl:   •  clopidogrel (PLAVIX) 75 mg tablet, , Disp: , Rfl:   •  dutasteride (AVODART) 0.5 mg capsule, , Disp: , Rfl:   •  latanoprost (XALATAN) 0.005 % ophthalmic solution, , Disp: , Rfl:   •  Restasis 0.05 % ophthalmic emulsion, , Disp: , Rfl:        Objective:    Blood pressure 121/73, pulse 71, temperature 97.6 °F (36.4 °C), temperature source Temporal, height 5' 8\" (1.727 m), weight 80.7 kg (177 lb 14.4 oz), SpO2 99%.    Neurological Exam    At the time of my examination he was awake and alert and in no distress.  He is an excellent historian with no dysarthria or aphasia.  Cranial nerves II through XII are symmetrically intact bilaterally.  Strength appears symmetric in the bilateral upper extremities with the exception of the right deltoid which is reduced as result of shoulder dysfunction.  Sensation was symmetric to light touch in the distal bilateral upper extremities.  He does have mildly reduced range of motion to head turn in both directions with effortfull neck extension and flexion    Review of Systems   Constitutional:  Negative for appetite change, fatigue and fever.   HENT: Negative.  " Negative for hearing loss, tinnitus, trouble swallowing and voice change.    Eyes:  Positive for photophobia (reports resolution since cataract sx) and visual disturbance (blurry vision with headaches). Negative for pain.   Respiratory: Negative.  Negative for shortness of breath.    Cardiovascular: Negative.  Negative for palpitations.   Gastrointestinal: Negative.  Negative for nausea and vomiting.   Endocrine: Negative.  Negative for cold intolerance.   Genitourinary: Negative.  Negative for dysuria, frequency and urgency.   Musculoskeletal:  Negative for back pain, gait problem, myalgias, neck pain and neck stiffness.   Skin: Negative.  Negative for rash.   Allergic/Immunologic: Negative.    Neurological:  Positive for headaches. Negative for dizziness, tremors, seizures, syncope, facial asymmetry, speech difficulty, weakness, light-headedness and numbness.   Hematological: Negative.  Does not bruise/bleed easily.   Psychiatric/Behavioral: Negative.  Negative for confusion, hallucinations and sleep disturbance.    All other systems reviewed and are negative.

## 2024-05-31 NOTE — PATIENT INSTRUCTIONS
Basilar artery occlusion/stenosis: Anam presents for follow-up evaluation with regard to his left vertebral artery occlusion and basilar artery stenosis/occlusion.  He reports no new strokelike symptoms.  He is taking his medicine as prescribed.  At the time of his last visit in the office clopidogrel was stopped.  Although I agree that this was appropriate he noticed a clear increase in his headaches thereafter and those headaches reduced in severity once the clopidogrel was restarted.  He remains on both aspirin and clopidogrel at this time with no significant bleeding or bruising issues.  -We will plan to repeat a CT angiogram of the head and neck over the course of the next few weeks to few months at his convenience  -For the time being we will plan to continue his combination of aspirin, clopidogrel, and statin with regard to overall vascular stability and stroke prevention  -We will defer to his primary care team for monitoring of his cholesterol panel and blood sugar numbers  -He should occasionally check his blood pressure away from the doctor's office and make sure the numbers are usually less than 130/80    Cervicalgia/headache: Anam reports that he is actually experiencing left occipital headaches 3 to 4 days out of the week.  He is not having additional migrainous features.  He does have some reduced range of motion in his neck, and this may be the source of his discomfort  -We will plan for an initial course of physical therapy to treat headache/neck discomfort  -Depending on the results of the physical therapy consideration will be given to adding a medication for headache prevention  -He can continue to use Tylenol at his discretion for treatment of headache.  He could also consider using a lidocaine gel rubbed into the area of discomfort.    From my standpoint he is doing exceptionally well.  I would like for him to return to see me directly in 8 months time but we would be happy to see him sooner if  the need should arise.  If he were to have new strokelike symptoms such as sudden painless loss of vision or double vision, difficulty speaking or swallowing, vertigo/room spinning that does not quickly resolve, or sudden weakness/numbness/loss of coordination affecting 1 side of the face or body he should proceed by ambulance to the nearest emergency room immediately.  If he completes a course of physical therapy and finds that his headaches are not improving or resolving I would like for him to contact our office and we will take next steps for headache prevention, he should not wait until his next office visit.

## 2024-07-01 ENCOUNTER — HOSPITAL ENCOUNTER (OUTPATIENT)
Dept: CT IMAGING | Facility: HOSPITAL | Age: 79
Discharge: HOME | End: 2024-07-01
Attending: PSYCHIATRY & NEUROLOGY
Payer: MEDICARE

## 2024-07-01 DIAGNOSIS — I65.02 VERTEBRAL ARTERY OCCLUSION, LEFT: ICD-10-CM

## 2024-07-01 DIAGNOSIS — I65.1 BASILAR ARTERY STENOSIS: ICD-10-CM

## 2024-07-01 PROCEDURE — 70496 CT ANGIOGRAPHY HEAD: CPT

## 2024-07-01 PROCEDURE — 70498 CT ANGIOGRAPHY NECK: CPT

## 2024-07-01 RX ADMIN — IOHEXOL 75 ML: 350 INJECTION, SOLUTION INTRAVENOUS at 13:10

## 2024-07-10 NOTE — RESULT ENCOUNTER NOTE
Ja Mendieta,    The CT angiogram was read out as looking quite stable.  There continue to be the same vascular changes that we were already aware of, but the radiologist did not report progressive or worsening changes.  They did mention a fluid collection in the region of the right shoulder so if you are having new or progressive right shoulder problems this may be something requiring additional imaging.  We will plan to continue your stroke prevention medicines as currently ordered.  Please let me know if you have any questions.  Thanks a lot!    -Dr. HAAS

## 2024-08-22 ENCOUNTER — TELEPHONE (OUTPATIENT)
Dept: NEUROLOGY | Facility: CLINIC | Age: 79
End: 2024-08-22

## 2024-08-22 NOTE — TELEPHONE ENCOUNTER
Received fax from  PT requesting signature from Dr Valencia.     Sent to Dr Valencia for signature.

## 2024-08-22 NOTE — TELEPHONE ENCOUNTER
Received signed from from Dr Valencia. Faxed to fax# [phone]. Placed in scanning bin at [facility].

## 2024-11-14 NOTE — ED ADULT NURSE NOTE - EXTENSIONS OF SELF_ADULT
Follow-up with PCP and with gastroenterology.  Begin taking pantoprazole every morning.  Take sucralfate before meals and before bedtime.  Go to the healing place upon discharge.  Return to emergency department for any worsening symptoms.  
None

## 2024-12-31 NOTE — ED ADULT NURSE NOTE - CADM POA PRESS ULCER
Dr. Tom recommends: Hypertension-taking Labetalol, History of preeclampsia, Covid 24  35w6d     scheduled for: 24 Genetics- Cell Free Fetal DNA - negative     Meds:  Aspirin 162 mg daily   Labetalol 100 mg three times daily       Labs:  Weekly HELLP labs-- for symptoms of preeclampsia     Ultrasounds:  Twice weekly BPP (fetal movement and well-being test)    Growth every 3-4 weeks (done 24)     Fetal Movement Counting (\"Kick Counts\")  How to count your baby's movements:  -Try to do your kick counts at about the same time each day during your baby's active time.  -Note the time. Count your baby's movements until you feel ten movements. Note the time again.   -If you felt ten movements in less than two hours, it is reassuring and you may resume your normal activities for the day and count again tomorrow.  -Always remain aware of your baby's movements.  -If it took longer than two hours to count ten movements, call your doctor right away for recommendations.     Continue routine pregnancy care with Dr. Christianson  
No

## 2025-01-10 ENCOUNTER — OFFICE VISIT (OUTPATIENT)
Dept: NEUROLOGY | Facility: CLINIC | Age: 80
End: 2025-01-10
Payer: MEDICARE

## 2025-01-10 VITALS
BODY MASS INDEX: 27.69 KG/M2 | SYSTOLIC BLOOD PRESSURE: 120 MMHG | WEIGHT: 182.7 LBS | HEART RATE: 77 BPM | HEIGHT: 68 IN | DIASTOLIC BLOOD PRESSURE: 78 MMHG

## 2025-01-10 DIAGNOSIS — R51.9 HEADACHE: Primary | ICD-10-CM

## 2025-01-10 PROCEDURE — 64450 NJX AA&/STRD OTHER PN/BRANCH: CPT | Performed by: PSYCHIATRY & NEUROLOGY

## 2025-01-10 PROCEDURE — 20553 NJX 1/MLT TRIGGER POINTS 3/>: CPT | Performed by: PSYCHIATRY & NEUROLOGY

## 2025-01-10 PROCEDURE — 64405 NJX AA&/STRD GR OCPL NRV: CPT | Performed by: PSYCHIATRY & NEUROLOGY

## 2025-01-10 RX ORDER — LIDOCAINE HYDROCHLORIDE 10 MG/ML
4 INJECTION, SOLUTION INFILTRATION; PERINEURAL ONCE
Status: COMPLETED | OUTPATIENT
Start: 2025-01-10 | End: 2025-01-10

## 2025-01-10 RX ORDER — TRIAMCINOLONE ACETONIDE 40 MG/ML
40 INJECTION, SUSPENSION INTRA-ARTICULAR; INTRAMUSCULAR ONCE
Status: COMPLETED | OUTPATIENT
Start: 2025-01-10 | End: 2025-01-10

## 2025-01-10 RX ORDER — BUPIVACAINE HYDROCHLORIDE 2.5 MG/ML
4 INJECTION, SOLUTION EPIDURAL; INFILTRATION; INTRACAUDAL ONCE
Status: COMPLETED | OUTPATIENT
Start: 2025-01-10 | End: 2025-01-10

## 2025-01-10 RX ADMIN — BUPIVACAINE HYDROCHLORIDE 4 ML: 2.5 INJECTION, SOLUTION EPIDURAL; INFILTRATION; INTRACAUDAL at 16:01

## 2025-01-10 RX ADMIN — LIDOCAINE HYDROCHLORIDE 4 ML: 10 INJECTION, SOLUTION INFILTRATION; PERINEURAL at 16:02

## 2025-01-10 RX ADMIN — TRIAMCINOLONE ACETONIDE 40 MG: 40 INJECTION, SUSPENSION INTRA-ARTICULAR; INTRAMUSCULAR at 16:02

## 2025-01-10 NOTE — ASSESSMENT & PLAN NOTE
Orders:  •  lidocaine (XYLOCAINE) 1 % injection 4 mL  •  bupivacaine (PF) (MARCAINE) 0.25 % injection 4 mL  •  triamcinolone acetonide (KENALOG-40) 40 mg/mL injection 40 mg

## 2025-01-10 NOTE — PROGRESS NOTES
Name: Cristhian Mayfield      : 1945      MRN: [id number]  Encounter Provider: Jalen Valencia MD  Encounter Date: 1/10/2025   Encounter department: NEUROLOGY [facility]  :  Assessment & Plan  Headache    Orders:  •  lidocaine (XYLOCAINE) 1 % injection 4 mL  •  bupivacaine (PF) (MARCAINE) 0.25 % injection 4 mL  •  triamcinolone acetonide (KENALOG-40) 40 mg/mL injection 40 mg      {Ambulatory Patient Instructions (Optional):87439}    History of Present Illness {?Quick Links Encounters * My Last Note * Last Note in Specialty * Snapshot * Since Last Visit * History :00901}  HPI1cc jaleel and allie, 0.5cc under that, 0.5cc neck, 1cc sucoppit, 0.5cc between lesser occip and suboccip  Review of Systems   Constitutional:  Negative for appetite change, fatigue and fever.   HENT: Negative.  Negative for hearing loss, tinnitus, trouble swallowing and voice change.    Eyes: Negative.  Negative for photophobia, pain and visual disturbance.   Respiratory: Negative.  Negative for shortness of breath.    Cardiovascular: Negative.  Negative for palpitations.   Gastrointestinal: Negative.  Negative for nausea and vomiting.   Endocrine: Negative.  Negative for cold intolerance.   Genitourinary: Negative.  Negative for dysuria, frequency and urgency.   Musculoskeletal:  Negative for back pain, gait problem, myalgias, neck pain and neck stiffness.   Skin: Negative.  Negative for rash.   Allergic/Immunologic: Negative.    Neurological: Negative.  Negative for dizziness, tremors, seizures, syncope, facial asymmetry, speech difficulty, weakness, light-headedness, numbness and headaches.   Hematological: Negative.  Does not bruise/bleed easily.   Psychiatric/Behavioral: Negative.  Negative for confusion, hallucinations and sleep disturbance.     I have personally reviewed the MA's review of systems and made changes as necessary.    {Select to insert medical history sections (Optional):92165}     Objective {?Quick Links  "Trend Vitals * Enter New Vitals * Results Review * Timeline (Adult) * Labs * Imaging * Cardiology * Procedures * Lung Cancer Screening * Surgical eConsent :26064}  /78 (BP Location: Left arm, Patient Position: Sitting, Cuff Size: Standard)   Pulse 77   Ht 5' 8\" (1.727 m)   Wt 82.9 kg (182 lb 11.2 oz)   BMI 27.78 kg/m²     Physical Exam  Neurological Exam    {Radiology Results Review (Optional):62959}    {Administrative / Billing Section (Optional):50037}  "

## 2025-01-13 NOTE — PROGRESS NOTES
Nerve block    Date/Time: 1/10/2025 2:30 PM    Performed by: Jalen Valencia MD  Authorized by: Jalen Valencia MD    Patient location:  Clinic  [place] Protocol:  Consent: Written consent obtained.  Consent given by: patient    Indications:     Indications:  Pain relief  Location:     Body area:  Head    Head nerve:  Greater occipital and lesser occipital    Nerve type:  Peripheral  Pre-procedure details:     Skin preparation:  Alcohol  Skin anesthesia (see MAR for exact dosages):     Skin anesthesia method:  None  Procedure details (see MAR for exact dosages):     Block needle gauge:  30 G    Injection procedure:  Anatomic landmarks identified, anatomic landmarks palpated, introduced needle and negative aspiration for blood  Post-procedure details:     Dressing:  None    Outcome:  Pain relieved    Patient tolerance of procedure:  Tolerated well, no immediate complications  Comments:       A mixture of 4cc Bupivacaine 0.25% without epi and 4cc of Lidocaine 1% without Epi and 2cc of Kenalog 40mg/ml was prepared.    Greater Occipital which received 1.5 cc on the left and Lesser Occipital which received 1.5 cc on the left.  In each case 1 cc was placed above the ridge line of the skull in typical fashion, 0.5 cc below.   Universal Protocol:  Procedure performed by:  Consent: Written consent obtained.  Consent given by: patient  Supporting Documentation  Indications: pain   Trigger Point Injections: multiple trigger points: 3 or more muscle groups    Injection site identified by: palpation  Procedure Details  Location(s):  Needle size: 30 G  Patient tolerance: patient tolerated the procedure well with no immediate complications  Additional procedure details:   0 spots on the right and 3 spots on the left were injected with 0.5cc of the above mixture    Preprocedure pain level was 10/10 with L head turn. Immediate post procedure pain level was 5/10, then 3/10.

## 2025-05-16 ENCOUNTER — OFFICE VISIT (OUTPATIENT)
Dept: NEUROLOGY | Facility: CLINIC | Age: 80
End: 2025-05-16
Payer: MEDICARE

## 2025-05-16 VITALS
BODY MASS INDEX: 27.16 KG/M2 | WEIGHT: 179.2 LBS | DIASTOLIC BLOOD PRESSURE: 70 MMHG | SYSTOLIC BLOOD PRESSURE: 120 MMHG | HEART RATE: 76 BPM | HEIGHT: 68 IN

## 2025-05-16 DIAGNOSIS — I65.1 BASILAR ARTERY STENOSIS: ICD-10-CM

## 2025-05-16 DIAGNOSIS — R51.9 HEADACHE: Primary | ICD-10-CM

## 2025-05-16 DIAGNOSIS — I65.02 VERTEBRAL ARTERY OCCLUSION, LEFT: ICD-10-CM

## 2025-05-16 PROCEDURE — 64405 NJX AA&/STRD GR OCPL NRV: CPT | Performed by: PSYCHIATRY & NEUROLOGY

## 2025-05-16 PROCEDURE — 64450 NJX AA&/STRD OTHER PN/BRANCH: CPT | Performed by: PSYCHIATRY & NEUROLOGY

## 2025-05-16 PROCEDURE — 99214 OFFICE O/P EST MOD 30 MIN: CPT | Performed by: PSYCHIATRY & NEUROLOGY

## 2025-05-16 PROCEDURE — 20553 NJX 1/MLT TRIGGER POINTS 3/>: CPT | Performed by: PSYCHIATRY & NEUROLOGY

## 2025-05-16 RX ORDER — TRIAMCINOLONE ACETONIDE 40 MG/ML
40 INJECTION, SUSPENSION INTRA-ARTICULAR; INTRAMUSCULAR ONCE
Status: COMPLETED | OUTPATIENT
Start: 2025-05-16 | End: 2025-05-16

## 2025-05-16 RX ORDER — BUPIVACAINE HYDROCHLORIDE 2.5 MG/ML
2 INJECTION, SOLUTION EPIDURAL; INFILTRATION; INTRACAUDAL; PERINEURAL ONCE
Status: COMPLETED | OUTPATIENT
Start: 2025-05-16 | End: 2025-05-16

## 2025-05-16 RX ORDER — LIDOCAINE HYDROCHLORIDE 10 MG/ML
2 INJECTION, SOLUTION INFILTRATION; PERINEURAL ONCE
Status: COMPLETED | OUTPATIENT
Start: 2025-05-16 | End: 2025-05-16

## 2025-05-16 RX ADMIN — BUPIVACAINE HYDROCHLORIDE 2 ML: 2.5 INJECTION, SOLUTION EPIDURAL; INFILTRATION; INTRACAUDAL; PERINEURAL at 14:50

## 2025-05-16 RX ADMIN — TRIAMCINOLONE ACETONIDE 40 MG: 40 INJECTION, SUSPENSION INTRA-ARTICULAR; INTRAMUSCULAR at 14:52

## 2025-05-16 RX ADMIN — LIDOCAINE HYDROCHLORIDE 2 ML: 10 INJECTION, SOLUTION INFILTRATION; PERINEURAL at 14:51

## 2025-05-16 NOTE — PATIENT INSTRUCTIONS
Basilar artery stenosis and headache: Cristhian presents for a follow-up evaluation with regard to his known basilar artery stenosis.  He did not endorse new strokelike symptoms today.  He did not describe significant bleeding or bruising issues.  He has experienced a reemergence of his left-sided headache but he did derive significant benefit from our prior injections.  His range of motion is significantly pain limited today  - We will plan to continue his stroke prevention measures including aspirin, Plavix, statin, and appropriate blood pressure and glycemic control  - He will be due coming up to have a repeat CT angiogram of the head and neck performed as it has been nearly a year since his last check.  I wrote that order for him.  - We performed a set of nerve blocks with a single trigger point injection in the office today.  He continues to derive significant benefit there from.    He will return to the office to see me directly in 3 months time for a planned round of injections in the greater and lesser occipital nerves.  If he were to have strokelike symptoms such as sudden painless loss of vision or double vision, difficulty speaking or swallowing, vertigo/room spinning that does not quickly resolve, or weakness/numbness/loss of coordination affecting 1 side of the face or body he should proceed by ambulance to the nearest emergency room immediately.  If he finds that the injections are wearing off significantly before he is due to come back to the office he should let me know and we will adjust the schedule.

## 2025-05-16 NOTE — PROGRESS NOTES
Name: Cristhian Mayfield      : 1945      MRN: [id number]  Encounter Provider: Jalen Valencia MD  Encounter Date: 2025   Encounter department: NEUROLOGY [facility]  :  Assessment & Plan  Basilar artery stenosis  Patient Instructions   Basilar artery stenosis and headache: Cristhian presents for a follow-up evaluation with regard to his known basilar artery stenosis.  He did not endorse new strokelike symptoms today.  He did not describe significant bleeding or bruising issues.  He has experienced a reemergence of his left-sided headache but he did derive significant benefit from our prior injections.  His range of motion is significantly pain limited today  - We will plan to continue his stroke prevention measures including aspirin, Plavix, statin, and appropriate blood pressure and glycemic control  - He will be due coming up to have a repeat CT angiogram of the head and neck performed as it has been nearly a year since his last check.  I wrote that order for him.  - We performed a set of nerve blocks with a single trigger point injection in the office today.  He continues to derive significant benefit there from.    He will return to the office to see me directly in 3 months time for a planned round of injections in the greater and lesser occipital nerves.  If he were to have strokelike symptoms such as sudden painless loss of vision or double vision, difficulty speaking or swallowing, vertigo/room spinning that does not quickly resolve, or weakness/numbness/loss of coordination affecting 1 side of the face or body he should proceed by ambulance to the nearest emergency room immediately.  If he finds that the injections are wearing off significantly before he is due to come back to the office he should let me know and we will adjust the schedule.      Orders:    CTA head and neck w wo contrast; Future    Basic metabolic panel; Future    Vertebral artery occlusion, left          Headache    Orders:    lidocaine (XYLOCAINE) 1 % injection 2 mL    bupivacaine (PF) (MARCAINE) 0.25 % injection 2 mL    triamcinolone acetonide (KENALOG-40) 40 mg/mL injection 40 mg    Inj Trigger Point Single/Multiple    Nerve block          History of Present Illness   HPI   History of Present Illness  The patient presents for evaluation of headaches.    He reports a shift in the location of his pain, which has moved more towards the left side and occurs almost daily. The pain is not constant throughout the day, but he does have some pain-free time. It is described as intermittent, with periods of relief when he is in a supine position. He notes that an incorrect pillow position can exacerbate the discomfort. The pain is particularly severe upon waking in the morning, accounting for approximately 80% of his episodes. Rapid movements or walking can trigger sharp pain. The pain occasionally radiates anteriorly and is associated with blurred vision during severe episodes. He also experiences photophobia, particularly in bright sunlight.     He describes the pain as exhausting and discomforting, often necessitating rest periods during activities. He also reports a decrease in energy levels and strength, limiting his ability to perform tasks for extended periods. Previous injections provided significant relief, reducing the pain by over 90% for a duration of 1 to 2 months. However, he experienced soreness at the injection site for several days post-procedure, which prevented him from lying on that side. Despite this, he reported improved head mobility post-injection.    INTERVAL: Since last visit, he reports a shift in pain location to the left side, with increased frequency and severity. He also notes blurred vision during severe pain episodes and photophobia.        Review of Systems   Constitutional:  Negative for appetite change, fatigue and fever.   HENT: Negative.  Negative for hearing loss, tinnitus, trouble  "swallowing and voice change.    Eyes: Negative.  Negative for photophobia, pain and visual disturbance.   Respiratory: Negative.  Negative for shortness of breath.    Cardiovascular: Negative.  Negative for palpitations.   Gastrointestinal: Negative.  Negative for nausea and vomiting.   Endocrine: Negative.  Negative for cold intolerance.   Genitourinary: Negative.  Negative for dysuria, frequency and urgency.   Musculoskeletal:  Negative for back pain, gait problem, myalgias, neck pain and neck stiffness.   Skin: Negative.  Negative for rash.   Allergic/Immunologic: Negative.    Neurological:  Negative for dizziness, tremors, seizures, syncope, facial asymmetry, speech difficulty, weakness, light-headedness, numbness and headaches.   Hematological: Negative.  Does not bruise/bleed easily.   Psychiatric/Behavioral: Negative.  Negative for confusion, hallucinations and sleep disturbance.     I have personally reviewed the MA's review of systems and made changes as necessary.         Objective   /70 (BP Location: Right arm, Patient Position: Sitting, Cuff Size: Standard)   Pulse 76   Ht 5' 8\" (1.727 m)   Wt 81.3 kg (179 lb 3.2 oz)   BMI 27.25 kg/m²     Physical Exam  Neurological Exam    At the time of my examination the patient was awake and alert and in no distress.  They are an excellent historian with no dysarthria or aphasia.  There were no clear new cranial neuropathies or lateralizing signs.  Limited cervical ROM with clear discomfort. There were no significant tremors or ataxia.  The patient was able to rise easily without assistance and their gait was stable.          Permission was given for AI monitoring of this visit and AI software was used in the generation of the note which was then adjusted.  "

## 2025-05-19 NOTE — ASSESSMENT & PLAN NOTE
Orders:    lidocaine (XYLOCAINE) 1 % injection 2 mL    bupivacaine (PF) (MARCAINE) 0.25 % injection 2 mL    triamcinolone acetonide (KENALOG-40) 40 mg/mL injection 40 mg    Inj Trigger Point Single/Multiple    Nerve block

## 2025-05-19 NOTE — ASSESSMENT & PLAN NOTE
Patient Instructions   Basilar artery stenosis and headache: Cristhian presents for a follow-up evaluation with regard to his known basilar artery stenosis.  He did not endorse new strokelike symptoms today.  He did not describe significant bleeding or bruising issues.  He has experienced a reemergence of his left-sided headache but he did derive significant benefit from our prior injections.  His range of motion is significantly pain limited today  - We will plan to continue his stroke prevention measures including aspirin, Plavix, statin, and appropriate blood pressure and glycemic control  - He will be due coming up to have a repeat CT angiogram of the head and neck performed as it has been nearly a year since his last check.  I wrote that order for him.  - We performed a set of nerve blocks with a single trigger point injection in the office today.  He continues to derive significant benefit there from.    He will return to the office to see me directly in 3 months time for a planned round of injections in the greater and lesser occipital nerves.  If he were to have strokelike symptoms such as sudden painless loss of vision or double vision, difficulty speaking or swallowing, vertigo/room spinning that does not quickly resolve, or weakness/numbness/loss of coordination affecting 1 side of the face or body he should proceed by ambulance to the nearest emergency room immediately.  If he finds that the injections are wearing off significantly before he is due to come back to the office he should let me know and we will adjust the schedule.      Orders:    CTA head and neck w wo contrast; Future    Basic metabolic panel; Future

## 2025-05-19 NOTE — PROGRESS NOTES
Universal Protocol:  Procedure performed by:  Consent: Written consent obtained  Consent given by: patient  Supporting Documentation  Indications: pain   Trigger Point Injections: multiple trigger points: 3 or more muscle groups    Injection site identified by: palpation  Procedure Details  Location(s):  Needle size: 30 G  Patient tolerance: patient tolerated the procedure well with no immediate complications  Additional procedure details: A mixture of 4cc bupivicaine 0.25% w/o epi, 4cc lidocaine 0.5% w/o epi, and 2cc of triamcinolone (40mg/ml) was prepared  Trigger points were identified by palpation.    2 spots on the right and 0 spots on the left were injected with 0.5cc of the above mixture    Preprocedure pain level was 6-7. Immediate post procedure pain level was 5.  Clear relief      Nerve block    Date/Time: 5/16/2025 1:30 PM    Performed by: Jalen Valencia MD  Authorized by: Jalen Valencia MD    Patient location:  Clinic    [place] Protocol:  Consent: Written consent obtained  Consent given by: patient    Indications:     Indications:  Pain relief  Location:     Body area:  Head    Head nerve:  Greater occipital and lesser occipital    Nerve type:  Peripheral    Laterality:  Left  Pre-procedure details:     Skin preparation:  Alcohol  Skin anesthesia (see MAR for exact dosages):     Skin anesthesia method:  None  Procedure details (see MAR for exact dosages):     Block needle gauge:  30 G    Injection procedure:  Anatomic landmarks identified, anatomic landmarks palpated, introduced needle and negative aspiration for blood  Post-procedure details:     Dressing:  None    Outcome:  Pain relieved  Comments:       A mixture of 2cc Bupivacaine 0.25% without epi and 2cc of Lidocaine 1% without Epi and 1cc of Kenalog 40mg/ml was prepared.    Greater Occipital which received 1.5cc on the left and Lesser Occipital which received 1.5cc on the left    Total waste 1cc

## 2025-06-24 LAB
BUN SERPL-MCNC: 30 MG/DL (ref 7–25)
BUN/CREAT SERPL: 25 (CALC) (ref 6–22)
CREAT SERPL-MCNC: 1.19 MG/DL (ref 0.7–1.22)
GFR/BSA.PRED SERPLBLD CYS-BASED-ARV: 61 ML/MIN/1.73M2

## 2025-06-30 ENCOUNTER — HOSPITAL ENCOUNTER (OUTPATIENT)
Dept: CT IMAGING | Facility: HOSPITAL | Age: 80
Discharge: HOME | End: 2025-06-30
Attending: PSYCHIATRY & NEUROLOGY
Payer: MEDICARE

## 2025-06-30 DIAGNOSIS — I65.1 BASILAR ARTERY STENOSIS: ICD-10-CM

## 2025-06-30 PROCEDURE — 70496 CT ANGIOGRAPHY HEAD: CPT

## 2025-06-30 PROCEDURE — 70498 CT ANGIOGRAPHY NECK: CPT

## 2025-06-30 RX ADMIN — IOHEXOL 85 ML: 350 INJECTION, SOLUTION INTRAVENOUS at 15:14
